# Patient Record
Sex: FEMALE | Race: WHITE | NOT HISPANIC OR LATINO | Employment: UNEMPLOYED | URBAN - METROPOLITAN AREA
[De-identification: names, ages, dates, MRNs, and addresses within clinical notes are randomized per-mention and may not be internally consistent; named-entity substitution may affect disease eponyms.]

---

## 2017-03-01 ENCOUNTER — HOSPITAL ENCOUNTER (INPATIENT)
Facility: HOSPITAL | Age: 32
LOS: 5 days | Discharge: HOME | DRG: 897 | End: 2017-03-06
Attending: PSYCHIATRY & NEUROLOGY | Admitting: PSYCHIATRY & NEUROLOGY
Payer: MEDICAID

## 2017-03-01 DIAGNOSIS — F15.10 AMPHETAMINE ABUSE: ICD-10-CM

## 2017-03-01 DIAGNOSIS — F11.10 OPIOID ABUSE: ICD-10-CM

## 2017-03-01 DIAGNOSIS — F14.10 COCAINE ABUSE: ICD-10-CM

## 2017-03-01 DIAGNOSIS — F19.94 SUBSTANCE INDUCED MOOD DISORDER: Chronic | ICD-10-CM

## 2017-03-01 DIAGNOSIS — F13.10: ICD-10-CM

## 2017-03-01 DIAGNOSIS — F31.9 BIPOLAR DISORDER: ICD-10-CM

## 2017-03-01 PROCEDURE — 25000003 PHARM REV CODE 250: Performed by: PSYCHIATRY & NEUROLOGY

## 2017-03-01 PROCEDURE — 12400001 HC PSYCH SEMI-PRIVATE ROOM

## 2017-03-01 RX ORDER — DIPHENHYDRAMINE HYDROCHLORIDE 50 MG/ML
25 INJECTION INTRAMUSCULAR; INTRAVENOUS EVERY 4 HOURS PRN
Status: DISCONTINUED | OUTPATIENT
Start: 2017-03-01 | End: 2017-03-02

## 2017-03-01 RX ORDER — IBUPROFEN 200 MG
1 TABLET ORAL DAILY
Status: DISCONTINUED | OUTPATIENT
Start: 2017-03-01 | End: 2017-03-06 | Stop reason: HOSPADM

## 2017-03-01 RX ORDER — LORAZEPAM 1 MG/1
1 TABLET ORAL EVERY 4 HOURS PRN
Status: DISCONTINUED | OUTPATIENT
Start: 2017-03-01 | End: 2017-03-02

## 2017-03-01 RX ORDER — HALOPERIDOL 5 MG/ML
2 INJECTION INTRAMUSCULAR EVERY 4 HOURS PRN
Status: DISCONTINUED | OUTPATIENT
Start: 2017-03-01 | End: 2017-03-02

## 2017-03-01 RX ORDER — FOLIC ACID 1 MG/1
1 TABLET ORAL DAILY
Status: DISCONTINUED | OUTPATIENT
Start: 2017-03-02 | End: 2017-03-06 | Stop reason: HOSPADM

## 2017-03-01 RX ORDER — CALCIUM CARBONATE 200(500)MG
1000 TABLET,CHEWABLE ORAL EVERY 8 HOURS PRN
Status: DISCONTINUED | OUTPATIENT
Start: 2017-03-01 | End: 2017-03-06 | Stop reason: HOSPADM

## 2017-03-01 RX ORDER — HALOPERIDOL 2 MG/1
2 TABLET ORAL EVERY 4 HOURS PRN
Status: DISCONTINUED | OUTPATIENT
Start: 2017-03-01 | End: 2017-03-02

## 2017-03-01 RX ORDER — DOCUSATE SODIUM 100 MG/1
100 CAPSULE, LIQUID FILLED ORAL DAILY PRN
Status: DISCONTINUED | OUTPATIENT
Start: 2017-03-01 | End: 2017-03-06 | Stop reason: HOSPADM

## 2017-03-01 RX ORDER — QUETIAPINE FUMARATE 25 MG/1
50 TABLET, FILM COATED ORAL NIGHTLY
Status: DISCONTINUED | OUTPATIENT
Start: 2017-03-01 | End: 2017-03-06 | Stop reason: HOSPADM

## 2017-03-01 RX ORDER — DIPHENHYDRAMINE HCL 25 MG
25 CAPSULE ORAL EVERY 4 HOURS PRN
Status: DISCONTINUED | OUTPATIENT
Start: 2017-03-01 | End: 2017-03-02

## 2017-03-01 RX ORDER — LORAZEPAM 2 MG/ML
1 INJECTION INTRAMUSCULAR EVERY 4 HOURS PRN
Status: DISCONTINUED | OUTPATIENT
Start: 2017-03-01 | End: 2017-03-02

## 2017-03-01 RX ORDER — TRAZODONE HYDROCHLORIDE 50 MG/1
50 TABLET ORAL NIGHTLY PRN
Status: DISCONTINUED | OUTPATIENT
Start: 2017-03-01 | End: 2017-03-06 | Stop reason: HOSPADM

## 2017-03-01 RX ORDER — HYDROXYZINE PAMOATE 25 MG/1
25 CAPSULE ORAL EVERY 8 HOURS PRN
Status: DISCONTINUED | OUTPATIENT
Start: 2017-03-01 | End: 2017-03-06 | Stop reason: HOSPADM

## 2017-03-01 RX ADMIN — QUETIAPINE FUMARATE 50 MG: 25 TABLET, FILM COATED ORAL at 10:03

## 2017-03-01 NOTE — IP AVS SNAPSHOT
[facility]  [address]  [facility] [address]  Phone: [phone]           Patient Discharge Instructions     Our goal is to set you up for success. This packet includes information on your condition, medications, and your home care. It will help you to care for yourself so you don't get sicker and need to go back to the hospital.     Please ask your nurse if you have any questions.        There are many details to remember when preparing to leave the hospital. Here is what you will need to do:    1. Take your medicine. If you are prescribed medications, review your Medication List in the following pages. You may have new medications to  at the pharmacy and others that you'll need to stop taking. Review the instructions for how and when to take your medications. Talk with your doctor or nurses if you are unsure of what to do.     2. Go to your follow-up appointments. Specific follow-up information is listed in the following pages. Your may be contacted by a transition nurse or clinical provider about future appointments. Be sure we have all of the phone numbers to reach you, if needed. Please contact your provider's office if you are unable to make an appointment.     3. Watch for warning signs. Your doctor or nurse will give you detailed warning signs to watch for and when to call for assistance. These instructions may also include educational information about your condition. If you experience any of warning signs to your health, call your doctor.               [facility]  Unless otherwise directed by your provider, please contact [facility], our nurse care line that is available for 24/7 assistance.     [phone] (toll-free)    Registered nurses in the [facility] provide clinical advisement, health education, appointment booking, and other advisory services.                    ** Verify the list of medication(s) below is accurate and up  to date. Carry this with you in case of emergency. If your medications have changed, please notify your healthcare provider.             Medication List      START taking these medications        Additional Info                      lamotrigine 25 MG tablet   Commonly known as:  LAMICTAL   Quantity:  30 tablet   Refills:  0   Dose:  25 mg   Indications:  substance-induced mood disorder    Last time this was given:  25 mg on 3/6/2017  8:11 AM   Instructions:  Take 1 tablet (25 mg total) by mouth once daily.     Begin Date    AM    Noon    PM    Bedtime         CHANGE how you take these medications        Additional Info                      trazodone 50 MG tablet   Commonly known as:  DESYREL   Quantity:  30 tablet   Refills:  0   Dose:  50 mg   Indications:  insomnia   What changed:    - when to take this  - reasons to take this    Instructions:  Take 1 tablet (50 mg total) by mouth nightly as needed for Insomnia.     Begin Date    AM    Noon    PM    Bedtime         CONTINUE taking these medications        Additional Info                      desogestrel-ethinyl estradiol 0.15-0.03 mg per tablet   Commonly known as:  APRI   Quantity:  84 tablet   Refills:  4   Dose:  1 tablet    Instructions:  Take 1 tablet by mouth once daily.     Begin Date    AM    Noon    PM    Bedtime       quetiapine 50 MG tablet   Commonly known as:  SEROQUEL   Quantity:  30 tablet   Refills:  0   Dose:  50 mg   Indications:  substance-induced mood disorder    Last time this was given:  50 mg on 3/5/2017  8:28 PM   Instructions:  Take 1 tablet (50 mg total) by mouth every evening.     Begin Date    AM    Noon    PM    Bedtime         STOP taking these medications     albuterol 90 mcg/actuation inhaler       alprazolam 1 MG tablet   Commonly known as:  XANAX       ibuprofen 800 MG tablet   Commonly known as:  ADVIL,MOTRIN       ondansetron 4 MG Tbdl   Commonly known as:  ZOFRAN-ODT            Where to Get Your Medications      You can get  "these medications from any pharmacy     Bring a paper prescription for each of these medications     lamotrigine 25 MG tablet    quetiapine 50 MG tablet    trazodone 50 MG tablet                  Please bring to all follow up appointments:    1. A copy of your discharge instructions.  2. All medicines you are currently taking in their original bottles.  3. Identification and insurance card.    Please arrive 15 minutes ahead of scheduled appointment time.    Please call 24 hours in advance if you must reschedule your appointment and/or time.        Follow-up Information     Go to [facility].    Specialties:  Behavioral Health, Psychiatry, Psychology    Why:  after care appt. walk in only M-F 8-4:30 p.m.     Contact information:    [address]  [address]  [facility] [address]  [phone]          Discharge Instructions     Future Orders    Activity as tolerated     Call MD for:     Comments:    Suicidal or homicidal ideation or change in behavior    Diet general     Questions:    Total calories:      Fat restriction, if any:      Protein restriction, if any:      Na restriction, if any:      Fluid restriction:      Additional restrictions:          Primary Diagnosis     Your primary diagnosis was:  Substance Induced Mood Disorder      Admission Information     Date & Time Provider Department CSN    3/1/2017  9:20 PM Saqib Everett MD [facility] [id number]       Admisson Diagnosis: Bipolar disorder, unspecified, Bipolar disorder      Care Providers     Provider Role Specialty Primary office phone    Saqib Everett MD Attending Provider Psychiatry [phone]      Your Vitals Were     BP Pulse Temp Resp Height Weight    101/61 (BP Location: Right arm, Patient Position: Sitting, BP Method: Automatic) 101 98.6 °F (37 °C) (Oral) 14 5' 6" (1.676 m) 55.2 kg (121 lb 11.1 oz)    Last Period SpO2 BMI          03/01/2017 100% 19.64 kg/m2        Recent Lab Values     No lab values to display. "      Blood Glucose and Lipid Panel Labs        3/3/2017                           6:05 AM           Cholesterol 152           Triglycerides 70           HDL Cholesterol 54           LDL Cholesterol 84.0           HDL/Cholesterol Ratio 35.5           Total Cholesterol/HDL Ratio 2.8           Non-HDL Cholesterol 98           Comment for Cholesterol at  6:05 AM on 3/3/2017:  The National Cholesterol Education Program (NCEP) has set the  following guidelines (reference ranges) for Cholesterol:  Optimal.....................<200 mg/dL  Borderline High.............200-239 mg/dL  High........................> or = 240 mg/dL      Comment for Triglycerides at  6:05 AM on 3/3/2017:  The National Cholesterol Education Program (NCEP) has set the  following guidelines (reference values) for triglycerides:  Normal......................<150 mg/dL  Borderline High.............150-199 mg/dL  High........................200-499 mg/dL      Comment for HDL Cholesterol at  6:05 AM on 3/3/2017:  The National Cholesterol Education Program (NCEP) has set the  following guidelines (reference values) for HDL Cholesterol:  Low...............<40 mg/dL  Optimal...........>60 mg/dL      Comment for LDL Cholesterol at  6:05 AM on 3/3/2017:  The National Cholesterol Education Program (NCEP) has set the  following guidelines (reference values) for LDL Cholesterol:  Optimal.......................<130 mg/dL  Borderline High...............130-159 mg/dL  High..........................160-189 mg/dL  Very High.....................>190 mg/dL      Comment for Non-HDL Cholesterol at  6:05 AM on 3/3/2017:  Risk category and Non-HDL cholesterol goals:  Coronary heart disease (CHD)or equivalent (10-year risk of CHD >20%):  Non-HDL cholesterol goal     <130 mg/dL  Two or more CHD risk factors and 10-year risk of CHD <= 20%:  Non-HDL cholesterol goal     <160 mg/dL  0 to 1 CHD risk factor:  Non-HDL cholesterol goal     <190 mg/dL        Allergies as of 3/6/2017      No Known Allergies      Advance Directives     An advance directive is a document which, in the event you are no longer able to make decisions for yourself, tells your healthcare team what kind of treatment you do or do not want to receive, or who you would like to make those decisions for you.  If you do not currently have an advance directive, [facility] encourages you to create one.  For more information call:  [phone] ([phone]), [phone] ([phone]),  or log on to [email or web address].        Advance Directive Status          Most Recent Value    Advance Directive Status  Patient does not have Advance Directive, declines information.      Smoking Cessation     If you would like to quit smoking:   You may be eligible for free services if you are a [place] resident and started smoking cigarettes before September 1, 1988.  Call the Smoking Cessation Trust (SCT) toll free at (217) 320-4245 or (898) 335-0645.   Call 5-090-QUIT-NOW if you do not meet the above criteria.            Language Assistance Services     ATTENTION: Language assistance services are available, free of charge. Please call [phone].      ATENCIÓN: Si habla español, tiene a ndiaye disposición servicios gratuitos de asistencia lingüística. Llame al [phone].     CHÚ Ý: N?u b?n nói Ti?ng Vi?t, có các d?ch v? h? tr? ngôn ng? mi?n phí dành cho b?n. G?i s? [phone].        National Suicide Prevention Lifeline     If you or someone you know is thinking about suicide, call the National Suicide Prevention Lifeline.  National Suicide Hotline: 9-319-751-TALK (0849)  The lifeline is free, confidential and always available.  Help a loved one, a friend or yourself.  www.suicideprevenetionlifeline.org          [facility] Sign-Up     Activating your [facility] account is as easy as 1-2-3!     1) Visit [email or web address], select Sign Up Now, enter this activation code and your date of birth, then select  Next.  [email or web address]  Expires: 4/20/2017  1:07 PM      2) Create a username and password to use when you visit [facility] in the future and select a security question in case you lose your password and select Next.    3) Enter your e-mail address and click Sign Up!    Additional Information  If you have questions, please e-mail [email or web address] or call [phone] to talk to our [facility] staff. Remember, [facility] is NOT to be used for urgent needs. For medical emergencies, dial 911.          [facility] complies with applicable Federal civil rights laws and does not discriminate on the basis of race, color, national origin, age, disability, or sex.

## 2017-03-02 PROBLEM — F19.94 SUBSTANCE INDUCED MOOD DISORDER: Chronic | Status: ACTIVE | Noted: 2017-03-01

## 2017-03-02 PROBLEM — F14.10 COCAINE ABUSE: Status: ACTIVE | Noted: 2017-03-02

## 2017-03-02 PROBLEM — F11.10 OPIOID ABUSE: Status: ACTIVE | Noted: 2017-03-02

## 2017-03-02 PROBLEM — F15.10 AMPHETAMINE ABUSE: Status: ACTIVE | Noted: 2017-03-02

## 2017-03-02 PROBLEM — F13.10 SEDATIVE OR HYPNOTIC ABUSE: Status: ACTIVE | Noted: 2017-03-02

## 2017-03-02 PROCEDURE — 25000003 PHARM REV CODE 250: Performed by: PSYCHIATRY & NEUROLOGY

## 2017-03-02 PROCEDURE — 12400001 HC PSYCH SEMI-PRIVATE ROOM

## 2017-03-02 PROCEDURE — 99223 1ST HOSP IP/OBS HIGH 75: CPT | Mod: AF,S$PBB,, | Performed by: PSYCHIATRY & NEUROLOGY

## 2017-03-02 RX ORDER — DIAZEPAM 5 MG/1
5 TABLET ORAL EVERY 6 HOURS PRN
Status: DISCONTINUED | OUTPATIENT
Start: 2017-03-02 | End: 2017-03-06 | Stop reason: HOSPADM

## 2017-03-02 RX ORDER — THIAMINE HCL 100 MG
100 TABLET ORAL DAILY
Status: DISCONTINUED | OUTPATIENT
Start: 2017-03-02 | End: 2017-03-06 | Stop reason: HOSPADM

## 2017-03-02 RX ORDER — DIAZEPAM 5 MG/1
5 TABLET ORAL ONCE
Status: COMPLETED | OUTPATIENT
Start: 2017-03-02 | End: 2017-03-02

## 2017-03-02 RX ORDER — LOPERAMIDE HYDROCHLORIDE 2 MG/1
2 CAPSULE ORAL
Status: DISCONTINUED | OUTPATIENT
Start: 2017-03-02 | End: 2017-03-06 | Stop reason: HOSPADM

## 2017-03-02 RX ORDER — OLANZAPINE 10 MG/1
10 TABLET ORAL EVERY 6 HOURS PRN
Status: DISCONTINUED | OUTPATIENT
Start: 2017-03-02 | End: 2017-03-06 | Stop reason: HOSPADM

## 2017-03-02 RX ORDER — FOLIC ACID 1 MG/1
1 TABLET ORAL DAILY
Status: DISCONTINUED | OUTPATIENT
Start: 2017-03-02 | End: 2017-03-02

## 2017-03-02 RX ORDER — OLANZAPINE 10 MG/2ML
10 INJECTION, POWDER, FOR SOLUTION INTRAMUSCULAR EVERY 6 HOURS PRN
Status: DISCONTINUED | OUTPATIENT
Start: 2017-03-02 | End: 2017-03-06 | Stop reason: HOSPADM

## 2017-03-02 RX ORDER — DICYCLOMINE HYDROCHLORIDE 10 MG/1
10 CAPSULE ORAL EVERY 6 HOURS PRN
Status: DISCONTINUED | OUTPATIENT
Start: 2017-03-02 | End: 2017-03-06 | Stop reason: HOSPADM

## 2017-03-02 RX ORDER — DIAZEPAM 5 MG/1
5 TABLET ORAL 3 TIMES DAILY
Status: DISPENSED | OUTPATIENT
Start: 2017-03-02 | End: 2017-03-04

## 2017-03-02 RX ORDER — CYCLOBENZAPRINE HCL 5 MG
5 TABLET ORAL EVERY 8 HOURS PRN
Status: DISCONTINUED | OUTPATIENT
Start: 2017-03-02 | End: 2017-03-06 | Stop reason: HOSPADM

## 2017-03-02 RX ADMIN — HYDROXYZINE PAMOATE 25 MG: 25 CAPSULE ORAL at 09:03

## 2017-03-02 RX ADMIN — DIAZEPAM 5 MG: 5 TABLET ORAL at 10:03

## 2017-03-02 RX ADMIN — THERA TABS 1 TABLET: TAB at 09:03

## 2017-03-02 RX ADMIN — FOLIC ACID 1 MG: 1 TABLET ORAL at 09:03

## 2017-03-02 RX ADMIN — THIAMINE HCL TAB 100 MG 100 MG: 100 TAB at 10:03

## 2017-03-02 RX ADMIN — QUETIAPINE FUMARATE 50 MG: 25 TABLET, FILM COATED ORAL at 08:03

## 2017-03-02 RX ADMIN — DIAZEPAM 5 MG: 5 TABLET ORAL at 08:03

## 2017-03-02 NOTE — ASSESSMENT & PLAN NOTE
- Utox positive for benzos.  - Reports daily use of Xanax for anxiety prescribed by Dr. John Reaves, PCP.   shows no evidence of concerning drug-seeking or misuse.      - Home regimen:  Xanax XR 1 mg BID and IR 0.5 mg qhs.    - Benzodiazepine withdrawal protocol with [id number]  - Valium taper:  5 mg TID (3/2-3), 5 mg BID (3/4), adjust as necessary  - PRN Valium for withdrawals:  5 mg q6h for s/s of withdrawals (SBP >160, DBP >100, HR >100, diaphoresis, tremulousness)  - thiamine, folic acid, multivitamin  - Counseled on cessation.  - Declines rehab at this time.

## 2017-03-02 NOTE — ASSESSMENT & PLAN NOTE
- Utox positive for cocaine.  - Pt states she was only taste-testing the quality of cocaine for a friend.  Denies using.  - Counseled on cessation.  - Declines rehab at this time.

## 2017-03-02 NOTE — NURSING
Patient lying in bed has been isolative to her room and bed all day.  Behavior appropriate when staff enters the room.  Medication compliant   Patient does come out for meals.

## 2017-03-02 NOTE — ASSESSMENT & PLAN NOTE
- Utox positive for opiates.  - Pt states she took a friend's pain pills for bilateral knee and back pain.  Reports h/o severe opiate dependence with IVDU x5 yrs ago.  Last used four years ago.  Denies current use.    - Opiate withdrawal protocol   - Symptomatic PRN medications:  TUMS (indigestion), Flexeril (muscle spasms), Bentyl (GI spasms), Imodium (diarrhea), Vistaril (anxiety), Colace (constipation)   - Counseled on cessation.  - Declines rehab at this time.
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
## 2017-03-02 NOTE — NURSING
Patient admitted to Acute Psychiatric Unit on 03/01/2017. Arrived to unit at approximately 2115 per nurse and security from [facility]. Per [facility], patient is diagnosed bipolar and is noncompliant with medications. Patient called father and stated she was going to kill herself. Patient has had a decline in ADLs and is sleeping all day. On assessment, patient denies depression or suicidal ideations. Poor insight into situation/hospitalization. Tearful but cooperative during assessment. Oriented to person, place, time. Educated on unit rules, plan of care, policies and procedures. Verbalized understanding.

## 2017-03-02 NOTE — PROGRESS NOTES
03/02/17 1300   [facility] Group Therapy   Group Name (1:1)   Specific Interventions Remotivation   Participation Level Minimal   Participation Quality Sleeping   Affect/Mood Display Blunted   Patient isolating in room most of day; Encouraged patient to make effort to attend groups

## 2017-03-02 NOTE — ASSESSMENT & PLAN NOTE
- Pt reports h/o bipolar disorder.  Mother with bipolar disorder.  Will reassess pt for symptoms of mood disorder after detoxification.  - Continue Seroquel 50 mg qhs for mood stabilization  - Continue Trazodone 50 mg qhs PRN for sleep  - PRN Zyprexa 10 mg PO/IM q6h for agitation

## 2017-03-02 NOTE — ASSESSMENT & PLAN NOTE
- Utox positive for amphetamines.  - Pt states she gets Adderall from friends for poor concentration.  No prior diagnosis of ADHD.  - Counseled on cessation.  - Declines rehab at this time.
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
## 2017-03-03 LAB
CHOLEST/HDLC SERPL: 2.8 {RATIO}
HDL/CHOLESTEROL RATIO: 35.5 %
HDLC SERPL-MCNC: 152 MG/DL
HDLC SERPL-MCNC: 54 MG/DL
LDLC SERPL CALC-MCNC: 84 MG/DL
NONHDLC SERPL-MCNC: 98 MG/DL
TRIGL SERPL-MCNC: 70 MG/DL

## 2017-03-03 PROCEDURE — 25000003 PHARM REV CODE 250: Performed by: PSYCHIATRY & NEUROLOGY

## 2017-03-03 PROCEDURE — 97165 OT EVAL LOW COMPLEX 30 MIN: CPT

## 2017-03-03 PROCEDURE — 80061 LIPID PANEL: CPT

## 2017-03-03 PROCEDURE — 12400001 HC PSYCH SEMI-PRIVATE ROOM

## 2017-03-03 PROCEDURE — 90853 GROUP PSYCHOTHERAPY: CPT | Mod: ,,, | Performed by: PSYCHOLOGIST

## 2017-03-03 PROCEDURE — 36415 COLL VENOUS BLD VENIPUNCTURE: CPT

## 2017-03-03 PROCEDURE — 99233 SBSQ HOSP IP/OBS HIGH 50: CPT | Mod: ,,, | Performed by: PSYCHIATRY & NEUROLOGY

## 2017-03-03 RX ORDER — DIAZEPAM 5 MG/1
5 TABLET ORAL 2 TIMES DAILY
Status: DISCONTINUED | OUTPATIENT
Start: 2017-03-04 | End: 2017-03-06 | Stop reason: HOSPADM

## 2017-03-03 RX ADMIN — QUETIAPINE FUMARATE 50 MG: 25 TABLET, FILM COATED ORAL at 09:03

## 2017-03-03 RX ADMIN — THERA TABS 1 TABLET: TAB at 10:03

## 2017-03-03 RX ADMIN — NICOTINE 1 PATCH: 14 PATCH, EXTENDED RELEASE TRANSDERMAL at 10:03

## 2017-03-03 RX ADMIN — DIAZEPAM 5 MG: 5 TABLET ORAL at 01:03

## 2017-03-03 RX ADMIN — DIAZEPAM 5 MG: 5 TABLET ORAL at 05:03

## 2017-03-03 RX ADMIN — FOLIC ACID 1 MG: 1 TABLET ORAL at 10:03

## 2017-03-03 RX ADMIN — DIAZEPAM 5 MG: 5 TABLET ORAL at 09:03

## 2017-03-03 RX ADMIN — CYCLOBENZAPRINE HYDROCHLORIDE 5 MG: 5 TABLET, FILM COATED ORAL at 07:03

## 2017-03-03 RX ADMIN — THIAMINE HCL TAB 100 MG 100 MG: 100 TAB at 10:03
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
## 2017-03-03 NOTE — PROGRESS NOTES
Group Psychotherapy (PhD/LCSW)    Site: [facility]    Clinical status of patient: Inpatient    Date: 3/3/2017    Group Focus: Life Skills    Length of service: 14761 - 35-40 minutes    Number of patients in attendance: 7    Referred by: Acute Psychiatry Unit Treatment Team    Target symptoms: Substance Abuse and Mood Disorder    Patient's response to treatment: Active Listening and Self-disclosure    Progress toward goals: Progressing slowly    Interval History: Pt participated actively in a group discussion of self-fulfilling prophecies. She tended to focus on her inability to solve her own problems.     Diagnosis: See Dr Gipson's notes dated 3/3/17    Plan: Continue treatment on APU

## 2017-03-03 NOTE — PROGRESS NOTES
03/02/17 2000   [facility] Group Therapy   Group Name Community Reintegration   Specific Interventions Other (see comments)   Participation Level Minimal   Participation Quality Lack of Interest
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
## 2017-03-03 NOTE — NURSING
Patient has been isolative to her room again today.  Mood is improving.  Patient became tearful today thinking about wanting to go home and be with her cat.  No complaints of detox symptoms.  Valium taper decreased.  Patient is dressed.  Appearance appropriate.  Eating and sleeping on the unit.

## 2017-03-03 NOTE — PROGRESS NOTES
tim spoke with Pina with [facility] crisis unit [phone]. Tim also spoke to Marshal earlier today with the same unit regarding pt's admission. Per report, a representative will visit pt in the unit to sign her up for services. Staff is aware of substance abuse problems and pt's stressors with her father. They are also aware pt might be facing issues with housing as her father is not going to allow her to move in with him.
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
## 2017-03-04 PROCEDURE — 25000003 PHARM REV CODE 250: Performed by: PSYCHIATRY & NEUROLOGY

## 2017-03-04 PROCEDURE — 12400001 HC PSYCH SEMI-PRIVATE ROOM

## 2017-03-04 RX ADMIN — QUETIAPINE FUMARATE 50 MG: 25 TABLET, FILM COATED ORAL at 08:03

## 2017-03-04 RX ADMIN — NICOTINE 1 PATCH: 14 PATCH, EXTENDED RELEASE TRANSDERMAL at 09:03

## 2017-03-04 RX ADMIN — DIAZEPAM 5 MG: 5 TABLET ORAL at 08:03

## 2017-03-04 RX ADMIN — FOLIC ACID 1 MG: 1 TABLET ORAL at 09:03

## 2017-03-04 RX ADMIN — HYDROXYZINE PAMOATE 25 MG: 25 CAPSULE ORAL at 03:03

## 2017-03-04 RX ADMIN — CYCLOBENZAPRINE HYDROCHLORIDE 5 MG: 5 TABLET, FILM COATED ORAL at 09:03

## 2017-03-04 RX ADMIN — THERA TABS 1 TABLET: TAB at 09:03

## 2017-03-04 RX ADMIN — DIAZEPAM 5 MG: 5 TABLET ORAL at 09:03

## 2017-03-04 RX ADMIN — THIAMINE HCL TAB 100 MG 100 MG: 100 TAB at 09:03
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
## 2017-03-04 NOTE — PROGRESS NOTES
Pt had ~ 7 hours of sleep. Pt remain free from fall or injury during HS. Continue to monitor pt safety and POC.

## 2017-03-05 PROCEDURE — 25000003 PHARM REV CODE 250: Performed by: PSYCHIATRY & NEUROLOGY

## 2017-03-05 PROCEDURE — 12400001 HC PSYCH SEMI-PRIVATE ROOM

## 2017-03-05 RX ORDER — LAMOTRIGINE 25 MG/1
25 TABLET ORAL DAILY
Status: DISCONTINUED | OUTPATIENT
Start: 2017-03-05 | End: 2017-03-06 | Stop reason: HOSPADM

## 2017-03-05 RX ADMIN — HYDROXYZINE PAMOATE 25 MG: 25 CAPSULE ORAL at 02:03

## 2017-03-05 RX ADMIN — DIAZEPAM 5 MG: 5 TABLET ORAL at 08:03

## 2017-03-05 RX ADMIN — QUETIAPINE FUMARATE 50 MG: 25 TABLET, FILM COATED ORAL at 08:03

## 2017-03-05 RX ADMIN — FOLIC ACID 1 MG: 1 TABLET ORAL at 08:03

## 2017-03-05 RX ADMIN — THERA TABS 1 TABLET: TAB at 08:03

## 2017-03-05 RX ADMIN — LAMOTRIGINE 25 MG: 25 TABLET ORAL at 04:03

## 2017-03-05 RX ADMIN — CYCLOBENZAPRINE HYDROCHLORIDE 5 MG: 5 TABLET, FILM COATED ORAL at 02:03

## 2017-03-05 RX ADMIN — NICOTINE 1 PATCH: 14 PATCH, EXTENDED RELEASE TRANSDERMAL at 08:03

## 2017-03-05 RX ADMIN — THIAMINE HCL TAB 100 MG 100 MG: 100 TAB at 08:03
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
## 2017-03-06 VITALS
SYSTOLIC BLOOD PRESSURE: 109 MMHG | OXYGEN SATURATION: 100 % | WEIGHT: 121.69 LBS | HEART RATE: 109 BPM | DIASTOLIC BLOOD PRESSURE: 66 MMHG | HEIGHT: 66 IN | TEMPERATURE: 99 F | RESPIRATION RATE: 16 BRPM | BODY MASS INDEX: 19.56 KG/M2

## 2017-03-06 PROCEDURE — 90853 GROUP PSYCHOTHERAPY: CPT | Mod: AJ,S$PBB,, | Performed by: PSYCHOLOGIST

## 2017-03-06 PROCEDURE — 99239 HOSP IP/OBS DSCHRG MGMT >30: CPT | Mod: AF,S$PBB,, | Performed by: PSYCHIATRY & NEUROLOGY

## 2017-03-06 PROCEDURE — 25000003 PHARM REV CODE 250: Performed by: PSYCHIATRY & NEUROLOGY

## 2017-03-06 RX ORDER — TRAZODONE HYDROCHLORIDE 50 MG/1
50 TABLET ORAL NIGHTLY PRN
Qty: 30 TABLET | Refills: 0 | Status: SHIPPED | OUTPATIENT
Start: 2017-03-06 | End: 2021-06-03

## 2017-03-06 RX ORDER — LAMOTRIGINE 25 MG/1
25 TABLET ORAL DAILY
Qty: 30 TABLET | Refills: 0 | Status: SHIPPED | OUTPATIENT
Start: 2017-03-06 | End: 2021-05-05 | Stop reason: SDUPTHER

## 2017-03-06 RX ORDER — QUETIAPINE FUMARATE 50 MG/1
50 TABLET, FILM COATED ORAL NIGHTLY
Qty: 30 TABLET | Refills: 0 | Status: SHIPPED | OUTPATIENT
Start: 2017-03-06 | End: 2017-03-06

## 2017-03-06 RX ORDER — QUETIAPINE FUMARATE 50 MG/1
50 TABLET, FILM COATED ORAL NIGHTLY
Qty: 30 TABLET | Refills: 0 | Status: SHIPPED | OUTPATIENT
Start: 2017-03-06 | End: 2021-06-03

## 2017-03-06 RX ADMIN — THIAMINE HCL TAB 100 MG 100 MG: 100 TAB at 08:03

## 2017-03-06 RX ADMIN — NICOTINE 1 PATCH: 14 PATCH, EXTENDED RELEASE TRANSDERMAL at 08:03

## 2017-03-06 RX ADMIN — FOLIC ACID 1 MG: 1 TABLET ORAL at 08:03

## 2017-03-06 RX ADMIN — THERA TABS 1 TABLET: TAB at 08:03

## 2017-03-06 RX ADMIN — LAMOTRIGINE 25 MG: 25 TABLET ORAL at 08:03

## 2017-03-06 RX ADMIN — DIAZEPAM 5 MG: 5 TABLET ORAL at 08:03

## 2017-03-06 RX ADMIN — CYCLOBENZAPRINE HYDROCHLORIDE 5 MG: 5 TABLET, FILM COATED ORAL at 09:03

## 2017-03-06 NOTE — PROGRESS NOTES
03/06/17 0900 03/06/17 1000 03/06/17 1100   [facility]   Group Name Community Reintegration Education (guided imagery)   Specific Interventions Current Events Relapse Prevention Relaxation Training   Participation Level Active;Supportive;Appropriate;Attentive;Sharing Active;Supportive;Appropriate;Attentive;Sharing Active;Appropriate;Attentive;Sharing   Participation Quality Cooperative;Social Cooperative;Social Cooperative;Social   Insight/Motivation Good Good Good   Affect/Mood Display Appropriate Appropriate Appropriate   Cognition Alert;Oriented Alert;Oriented Alert;Oriented        03/06/17 0900 03/06/17 1000 03/06/17 1100   [facility]   Group Name Community Reintegration Education (guided imagery)   Specific Interventions Current Events Relapse Prevention Relaxation Training   Participation Level Active;Supportive;Appropriate;Attentive;Sharing Active;Supportive;Appropriate;Attentive;Sharing Active;Appropriate;Attentive;Sharing   Participation Quality Cooperative;Social Cooperative;Social Cooperative;Social   Insight/Motivation Good Good Good   Affect/Mood Display Appropriate Appropriate Appropriate   Cognition Alert;Oriented Alert;Oriented Alert;Oriented

## 2017-03-06 NOTE — NURSING
Patient discharged to home.  Father provided transportation.  Mood is bright and behavior is appropriate.  Reviewed discharge instructions with patient, she verbalized understanding.  All belongings sent with patient.  Copy of discharge instructions and prescriptions sent with patient.  No injuries noted.

## 2017-03-06 NOTE — PROGRESS NOTES
03/04/17 0900 03/04/17 1000 03/04/17 1100   [facility]   Group Name Community Reintegration Therapeutic Recreation Therapeutic Recreation   Specific Interventions Current Events Cognitive Stimulation Training (tv social)   Participation Level Supportive;Active;Sharing --  --    Participation Quality Cooperative Sleeping Sleeping   Insight/Motivation Limited --  --    Affect/Mood Display Blunted --  --
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
## 2017-03-06 NOTE — PROGRESS NOTES
Group Psychotherapy (PhD/LCSW)    Site: [facility]    Clinical status of patient: Inpatient    Date: 3/6/2017    Group Focus: Life Skills    Length of service: 34154 - 35-40 minutes    Number of patients in attendance: 8    Referred by: Acute Psychiatry Unit Treatment Team    Target symptoms: Substance Abuse and Mood Disorder    Patient's response to treatment: Active Listening and Self-disclosure    Progress toward goals: Progressing slowly    Interval History:   Session focus was Self-Soothe Skills.  Patient appeared alert and attentive in group.  She identified self-soothe skills for each of the five senses.    Diagnosis: See Dr Gipson's notes dated 3/3/17    Plan: Continue treatment on APU until discharge

## 2017-03-06 NOTE — PLAN OF CARE
Problem: Patient Care Overview (Adult)  Goal: Plan of Care Review  Outcome: Ongoing (interventions implemented as appropriate)  Calm and cooperative. Denies SI/HI. Denies A/V hallucinations. Pt reports anxiety and muscle cramps and received medication for these complaints. Bright affect. Compliant medication. Reviewed medication with patient. Pt is aware that if she has a rash she must report it to staff. In and out of the groups. Dressed in street clothes. Will continue to monitor.

## 2017-03-06 NOTE — NURSING
Pt AAOX4,  denies SI/HI/A/V hallucinations. Pt denies pain or discomfort, reports feeling less anxious and depressed than when admitted. Discharge instructions reviewed, pt verbalizes understanding of discharge orders and medication reconciliation.

## 2017-03-06 NOTE — NURSING
Initial encounter with the patient today, Pt AAOX4, denies any complaints or distress. Pt denies SI/HI, verbalizes understanding to notify staff if she feels distressed. Pt compliant and quiet on the unit, is still isolative but beginning to interact more on the unit.

## 2017-03-07 NOTE — PT/OT/SLP DISCHARGE
"Occupational Therapy Discharge Summary    Enriqueta Quiroz  MRN: [id number]   Substance induced mood disorder   Patient Discharged from acute Occupational Therapy on 3/6/2017.  Please refer to prior OT note dated on 3/3/2017 for functional status upon initial evaluation. Pt did not attend OT group.      Assessment:   Patient has not met goals.     GOALS:   1. Pt will attend group with min encouragement and remain for 75% of session.  2. Pt will be able to manage group task 75% with 0 level of frustration.   3. Pt will be able to initiate participation in social task with 0 verbal cues.   4. In order to address social isolation and group activity, Pt will be able to initiate verbalization with group discussion with 0 encouragement. .   5. Pt will verbalize/demonstrate understanding of group purpose with 0 verbal cues at end of session.   6. Pt will report and demo understanding of 1 positive self-affirmation to use to as coping skills.   7. Pt will verbalize/demo understanding and identify use of 1-2 coping skills to use when upset.   8. Pt will verbalize/demo 1 healthy daily routine to incorporate for self modulation and regulation.      Patient Goals:  1."To get out of here"    Reasons for Discontinuation of Therapy Services  Transfer to alternate level of care.      Plan:  Patient Discharged to: Home with Father; no further OT needs at this time     KAYLA Harry 3/7/2017   "

## 2017-03-08 ENCOUNTER — PATIENT OUTREACH (OUTPATIENT)
Dept: ADMINISTRATIVE | Facility: CLINIC | Age: 32
End: 2017-03-08
Payer: MEDICAID

## 2017-03-08 DIAGNOSIS — F19.94 SUBSTANCE INDUCED MOOD DISORDER: Primary | ICD-10-CM

## 2017-03-08 NOTE — PATIENT INSTRUCTIONS
"  Bipolar Disorder  Bipolar disorder is an illness that causes strong mood swings between depression and cristi. It used to be called "manic depression." The mood swings are different from the normal ups and downs we all normally experience in our lives. They are more severe, last longer, and can interfere with work and relationships. These episodes are changes from our usual moods and behavior. Their severity can be mild, or drastic and explosive.  · In a manic episode, you may think fast and do things quickly. It may seem like you are getting a lot done. At first, this may feel very good. But in the extreme this can lead to a lifestyle that is disorganized, chaotic, and includes risky behavior (spending sprees, sexual acting-out, or drug use). In later stages, it may affect eating (no interest in food) and sleeping (unable to sleep for days at a time). Speech may speed up and become difficult for others to understand. You may appear to others as if you are in your own world.  · In a depressive episode, you may feel a lack of interest in normal activities. Sometimes there is sadness or guilt without any clear reason. Thinking may become slow and there can be a lack energy or feeling of hopelessness. Some people have thoughts of harming themselves at this stage. Thoughts can even turn to suicide.  Between these phases you may actually feel OK. This does not mean that the illness is gone. People with this disorder will usually have to treat it all of their life. Medicine and good care can greatly reduce the symptoms.  The exact cause of this illness is unknown. However, there is a genetic link that makes a person more likely to get this problem. Also, the use of drugs such as speed (amphetamine) and cocaine increase the chances of this illness appearing.  Home care  · On-going care and support helps people manage this disease.  Find a healthcare provider and therapist who meet your needs.  Seek help when you feel " like you may be heading into either a manic episode or a depressive state.  · Be sure to take your medicine and get regular blood work to check your the levels of medicine in your body.  Take the medicine and get the follow-up lab work as prescribed, even if you think you dont need to do it.  · Be certain to tell each of your healthcare providers about all of the prescription drugs, over-the-counter medicines, and supplements you take. Certain supplements interact with medicines and result in dangerous side effects.  You can also use your pharmacist as a resource person when you have questions about drug interactions.  · Talk with your family and trusted friends about your thoughts and feelings.  Ask them to help you recognize behavior changes early so you can get help and medicines can be adjusted.  · Alcohol and drugs can bring on an episode, and also make them worse  · If your life is severely impacted by this illness, the Americans with Disabilities Act (ADA) may provide help. The ADA protects people with chronic physical and mental health problems. If you are having trouble keeping jobs, managing workplace issues, or caring for yourself because of your bipolar disorder, contact your local ADA office to see if they can help. The [place] Department  of Justice operates a toll-free ADA information line at: 686.695.9097 (Voice); or 282-149-0579 (TTY).  They can help you locate a local office.    Follow-up care  Follow up with your doctor or therapist as advised. They can help you to find ways to improve your life.  Call 911  Call your healthcare provider right away if any of these occur:  · You have suicidal thoughts, a plan, and the means to  harm yourself  · Trouble breathing  · Confusion  · Drowsiness or trouble wakening  · Fainting or loss of consciousness  · Rapid heart rate, very low heart rate, or a new irregular heart rate  · Seizure  · New chest pain that becomes more severe, lasts longer, or spreads into your  shoulder, arm, neck, jaw, or back  When to seek medical advice  Call your healthcare provider right away if any of these occur:  · Feeling like your symptoms are getting worse (depression, agitation, excess energy)  · Unable to eat or sleep for more than 48 hours  · Feeling out of control (racing thoughts, poor concentration)  · Feeling like you want to harm yourself or another  · Being unable to care for yourself  Date Last Reviewed: 9/29/2015 © 2000-2016 [employer]. [address] [place] [address], [place] [address]. All rights reserved. This information is not intended as a substitute for professional medical care. Always follow your healthcare professional's instructions.

## 2017-03-09 ENCOUNTER — TELEPHONE (OUTPATIENT)
Dept: PHARMACY | Facility: CLINIC | Age: 32
End: 2017-03-09

## 2017-03-09 NOTE — TELEPHONE ENCOUNTER
This patient has Medicaid so she doesn't qualify for any programs with Pharmacy Patient Assistance.  I called to inform her about medicaid and he stated she was aware that she has medicaid.  I provided my contact information for future assistance.

## 2021-04-19 ENCOUNTER — LAB VISIT (OUTPATIENT)
Dept: PRIMARY CARE CLINIC | Facility: OTHER | Age: 36
End: 2021-04-19
Payer: MEDICAID

## 2021-04-19 DIAGNOSIS — Z20.822 ENCOUNTER FOR LABORATORY TESTING FOR COVID-19 VIRUS: ICD-10-CM

## 2021-04-19 PROCEDURE — U0003 INFECTIOUS AGENT DETECTION BY NUCLEIC ACID (DNA OR RNA); SEVERE ACUTE RESPIRATORY SYNDROME CORONAVIRUS 2 (SARS-COV-2) (CORONAVIRUS DISEASE [COVID-19]), AMPLIFIED PROBE TECHNIQUE, MAKING USE OF HIGH THROUGHPUT TECHNOLOGIES AS DESCRIBED BY CMS-2020-01-R: HCPCS | Performed by: INTERNAL MEDICINE

## 2021-04-20 LAB — SARS-COV-2 RNA RESP QL NAA+PROBE: NOT DETECTED

## 2021-05-05 ENCOUNTER — INITIAL PRENATAL (OUTPATIENT)
Dept: OBSTETRICS AND GYNECOLOGY | Facility: CLINIC | Age: 36
End: 2021-05-05
Payer: MEDICAID

## 2021-05-05 ENCOUNTER — PATIENT MESSAGE (OUTPATIENT)
Dept: OBSTETRICS AND GYNECOLOGY | Facility: CLINIC | Age: 36
End: 2021-05-05

## 2021-05-05 VITALS — SYSTOLIC BLOOD PRESSURE: 141 MMHG | DIASTOLIC BLOOD PRESSURE: 86 MMHG

## 2021-05-05 DIAGNOSIS — N91.2 AMENORRHEA: Primary | ICD-10-CM

## 2021-05-05 LAB
BACTERIA #/AREA URNS AUTO: ABNORMAL /HPF
BILIRUB UR QL STRIP: NEGATIVE
CAOX CRY UR QL COMP ASSIST: ABNORMAL
CLARITY UR REFRACT.AUTO: ABNORMAL
COLOR UR AUTO: YELLOW
GLUCOSE UR QL STRIP: NEGATIVE
HGB UR QL STRIP: NEGATIVE
KETONES UR QL STRIP: NEGATIVE
LEUKOCYTE ESTERASE UR QL STRIP: ABNORMAL
MICROSCOPIC COMMENT: ABNORMAL
NITRITE UR QL STRIP: NEGATIVE
PH UR STRIP: 7 [PH] (ref 5–8)
PROT UR QL STRIP: NEGATIVE
RBC #/AREA URNS AUTO: 1 /HPF (ref 0–4)
SP GR UR STRIP: 1.02 (ref 1–1.03)
SQUAMOUS #/AREA URNS AUTO: 9 /HPF
URN SPEC COLLECT METH UR: ABNORMAL
WBC #/AREA URNS AUTO: 11 /HPF (ref 0–5)

## 2021-05-05 PROCEDURE — 87077 CULTURE AEROBIC IDENTIFY: CPT | Performed by: OBSTETRICS & GYNECOLOGY

## 2021-05-05 PROCEDURE — 99203 PR OFFICE/OUTPT VISIT, NEW, LEVL III, 30-44 MIN: ICD-10-PCS | Mod: TH,S$PBB,, | Performed by: OBSTETRICS & GYNECOLOGY

## 2021-05-05 PROCEDURE — 99999 PR PBB SHADOW E&M-EST. PATIENT-LVL II: ICD-10-PCS | Mod: PBBFAC,,, | Performed by: OBSTETRICS & GYNECOLOGY

## 2021-05-05 PROCEDURE — 99212 OFFICE O/P EST SF 10 MIN: CPT | Mod: PBBFAC | Performed by: OBSTETRICS & GYNECOLOGY

## 2021-05-05 PROCEDURE — 99999 PR PBB SHADOW E&M-EST. PATIENT-LVL II: CPT | Mod: PBBFAC,,, | Performed by: OBSTETRICS & GYNECOLOGY

## 2021-05-05 PROCEDURE — 87086 URINE CULTURE/COLONY COUNT: CPT | Performed by: OBSTETRICS & GYNECOLOGY

## 2021-05-05 PROCEDURE — 81001 URINALYSIS AUTO W/SCOPE: CPT | Performed by: OBSTETRICS & GYNECOLOGY

## 2021-05-05 PROCEDURE — 87088 URINE BACTERIA CULTURE: CPT | Performed by: OBSTETRICS & GYNECOLOGY

## 2021-05-05 PROCEDURE — 99203 OFFICE O/P NEW LOW 30 MIN: CPT | Mod: TH,S$PBB,, | Performed by: OBSTETRICS & GYNECOLOGY

## 2021-05-05 PROCEDURE — 87186 SC STD MICRODIL/AGAR DIL: CPT | Performed by: OBSTETRICS & GYNECOLOGY

## 2021-05-05 RX ORDER — PROMETHAZINE HYDROCHLORIDE 12.5 MG/1
12.5 TABLET ORAL EVERY 8 HOURS PRN
Qty: 30 TABLET | Refills: 1 | Status: SHIPPED | OUTPATIENT
Start: 2021-05-05 | End: 2021-06-04 | Stop reason: SDUPTHER

## 2021-05-05 RX ORDER — LAMOTRIGINE 25 MG/1
25 TABLET ORAL DAILY
Qty: 30 TABLET | Refills: 0 | Status: SHIPPED | OUTPATIENT
Start: 2021-05-05 | End: 2021-06-03 | Stop reason: SDUPTHER

## 2021-05-05 RX ORDER — LORAZEPAM 0.5 MG/1
0.5 TABLET ORAL DAILY PRN
Qty: 8 TABLET | Refills: 0 | Status: SHIPPED | OUTPATIENT
Start: 2021-05-05 | End: 2021-06-03

## 2021-05-05 RX ORDER — LORAZEPAM 0.5 MG/1
0.5 TABLET ORAL DAILY
COMMUNITY
Start: 2021-03-23 | End: 2021-05-05 | Stop reason: SDUPTHER

## 2021-05-07 ENCOUNTER — PATIENT MESSAGE (OUTPATIENT)
Dept: OBSTETRICS AND GYNECOLOGY | Facility: CLINIC | Age: 36
End: 2021-05-07

## 2021-05-07 LAB — BACTERIA UR CULT: ABNORMAL

## 2021-05-09 ENCOUNTER — PATIENT MESSAGE (OUTPATIENT)
Dept: OBSTETRICS AND GYNECOLOGY | Facility: CLINIC | Age: 36
End: 2021-05-09

## 2021-05-18 ENCOUNTER — PATIENT MESSAGE (OUTPATIENT)
Dept: OBSTETRICS AND GYNECOLOGY | Facility: CLINIC | Age: 36
End: 2021-05-18

## 2021-05-20 ENCOUNTER — PATIENT MESSAGE (OUTPATIENT)
Dept: ADMINISTRATIVE | Facility: OTHER | Age: 36
End: 2021-05-20

## 2021-05-21 ENCOUNTER — PATIENT MESSAGE (OUTPATIENT)
Dept: OBSTETRICS AND GYNECOLOGY | Facility: CLINIC | Age: 36
End: 2021-05-21

## 2021-05-24 ENCOUNTER — TELEPHONE (OUTPATIENT)
Dept: OBSTETRICS AND GYNECOLOGY | Facility: CLINIC | Age: 36
End: 2021-05-24

## 2021-05-24 DIAGNOSIS — O99.341 ANXIETY IN PREGNANCY IN FIRST TRIMESTER, ANTEPARTUM: Primary | ICD-10-CM

## 2021-05-24 DIAGNOSIS — F41.9 ANXIETY IN PREGNANCY IN FIRST TRIMESTER, ANTEPARTUM: Primary | ICD-10-CM

## 2021-05-25 ENCOUNTER — TELEPHONE (OUTPATIENT)
Dept: OBSTETRICS AND GYNECOLOGY | Facility: CLINIC | Age: 36
End: 2021-05-25

## 2021-05-25 ENCOUNTER — PATIENT MESSAGE (OUTPATIENT)
Dept: OBSTETRICS AND GYNECOLOGY | Facility: CLINIC | Age: 36
End: 2021-05-25

## 2021-05-25 DIAGNOSIS — N91.2 AMENORRHEA: Primary | ICD-10-CM

## 2021-05-25 DIAGNOSIS — F31.9 BIPOLAR AFFECTIVE DISORDER, REMISSION STATUS UNSPECIFIED: Primary | ICD-10-CM

## 2021-05-25 RX ORDER — LORAZEPAM 0.5 MG/1
0.5 TABLET ORAL 2 TIMES DAILY PRN
Qty: 30 TABLET | Refills: 0 | Status: SHIPPED | OUTPATIENT
Start: 2021-05-25 | End: 2021-06-03 | Stop reason: SDUPTHER

## 2021-05-27 ENCOUNTER — PATIENT MESSAGE (OUTPATIENT)
Dept: OBSTETRICS AND GYNECOLOGY | Facility: CLINIC | Age: 36
End: 2021-05-27

## 2021-05-27 ENCOUNTER — PATIENT MESSAGE (OUTPATIENT)
Dept: ADMINISTRATIVE | Facility: OTHER | Age: 36
End: 2021-05-27

## 2021-05-28 ENCOUNTER — TELEPHONE (OUTPATIENT)
Dept: OBSTETRICS AND GYNECOLOGY | Facility: CLINIC | Age: 36
End: 2021-05-28

## 2021-05-28 ENCOUNTER — SOCIAL WORK (OUTPATIENT)
Dept: OBSTETRICS AND GYNECOLOGY | Facility: OTHER | Age: 36
End: 2021-05-28

## 2021-05-28 ENCOUNTER — PATIENT MESSAGE (OUTPATIENT)
Dept: PSYCHIATRY | Facility: CLINIC | Age: 36
End: 2021-05-28

## 2021-05-28 ENCOUNTER — PATIENT MESSAGE (OUTPATIENT)
Dept: OBSTETRICS AND GYNECOLOGY | Facility: CLINIC | Age: 36
End: 2021-05-28

## 2021-06-03 ENCOUNTER — ROUTINE PRENATAL (OUTPATIENT)
Dept: OBSTETRICS AND GYNECOLOGY | Facility: CLINIC | Age: 36
End: 2021-06-03
Payer: MEDICAID

## 2021-06-03 VITALS
WEIGHT: 130.06 LBS | DIASTOLIC BLOOD PRESSURE: 65 MMHG | BODY MASS INDEX: 20.99 KG/M2 | SYSTOLIC BLOOD PRESSURE: 120 MMHG

## 2021-06-03 DIAGNOSIS — Z3A.14 14 WEEKS GESTATION OF PREGNANCY: Primary | ICD-10-CM

## 2021-06-03 PROCEDURE — 99213 OFFICE O/P EST LOW 20 MIN: CPT | Mod: TH,S$PBB,, | Performed by: OBSTETRICS & GYNECOLOGY

## 2021-06-03 PROCEDURE — 99999 PR PBB SHADOW E&M-EST. PATIENT-LVL II: ICD-10-PCS | Mod: PBBFAC,,, | Performed by: OBSTETRICS & GYNECOLOGY

## 2021-06-03 PROCEDURE — 99212 OFFICE O/P EST SF 10 MIN: CPT | Mod: PBBFAC | Performed by: OBSTETRICS & GYNECOLOGY

## 2021-06-03 PROCEDURE — 99213 PR OFFICE/OUTPT VISIT, EST, LEVL III, 20-29 MIN: ICD-10-PCS | Mod: TH,S$PBB,, | Performed by: OBSTETRICS & GYNECOLOGY

## 2021-06-03 PROCEDURE — 99999 PR PBB SHADOW E&M-EST. PATIENT-LVL II: CPT | Mod: PBBFAC,,, | Performed by: OBSTETRICS & GYNECOLOGY

## 2021-06-03 RX ORDER — LAMOTRIGINE 100 MG/1
100 TABLET ORAL DAILY
COMMUNITY
Start: 2021-05-07 | End: 2021-06-03

## 2021-06-03 RX ORDER — LORAZEPAM 0.5 MG/1
0.5 TABLET ORAL 2 TIMES DAILY PRN
Qty: 60 TABLET | Refills: 0 | Status: SHIPPED | OUTPATIENT
Start: 2021-06-03 | End: 2021-07-08 | Stop reason: SDUPTHER

## 2021-06-03 RX ORDER — LAMOTRIGINE 25 MG/1
50 TABLET ORAL DAILY
Qty: 60 TABLET | Refills: 1 | Status: SHIPPED | OUTPATIENT
Start: 2021-06-03 | End: 2021-07-08 | Stop reason: SDUPTHER

## 2021-06-04 ENCOUNTER — PATIENT MESSAGE (OUTPATIENT)
Dept: MATERNAL FETAL MEDICINE | Facility: CLINIC | Age: 36
End: 2021-06-04

## 2021-06-04 ENCOUNTER — PATIENT MESSAGE (OUTPATIENT)
Dept: OBSTETRICS AND GYNECOLOGY | Facility: CLINIC | Age: 36
End: 2021-06-04

## 2021-06-04 ENCOUNTER — TELEPHONE (OUTPATIENT)
Dept: MATERNAL FETAL MEDICINE | Facility: CLINIC | Age: 36
End: 2021-06-04

## 2021-06-04 ENCOUNTER — PATIENT MESSAGE (OUTPATIENT)
Dept: PSYCHIATRY | Facility: CLINIC | Age: 36
End: 2021-06-04

## 2021-06-04 ENCOUNTER — TELEPHONE (OUTPATIENT)
Dept: PSYCHIATRY | Facility: CLINIC | Age: 36
End: 2021-06-04

## 2021-06-07 ENCOUNTER — TELEPHONE (OUTPATIENT)
Dept: ADMINISTRATIVE | Facility: OTHER | Age: 36
End: 2021-06-07

## 2021-06-10 ENCOUNTER — PATIENT MESSAGE (OUTPATIENT)
Dept: OBSTETRICS AND GYNECOLOGY | Facility: CLINIC | Age: 36
End: 2021-06-10

## 2021-06-11 ENCOUNTER — OFFICE VISIT (OUTPATIENT)
Dept: PSYCHIATRY | Facility: CLINIC | Age: 36
End: 2021-06-11
Payer: MEDICAID

## 2021-06-11 DIAGNOSIS — F43.9 TRAUMA AND STRESSOR-RELATED DISORDER: ICD-10-CM

## 2021-06-11 DIAGNOSIS — F31.60 BIPOLAR 1 DISORDER, MIXED: Primary | ICD-10-CM

## 2021-06-11 PROCEDURE — 90791 PR PSYCHIATRIC DIAGNOSTIC EVALUATION: ICD-10-PCS | Mod: HP,HB,95, | Performed by: PSYCHOLOGIST

## 2021-06-11 PROCEDURE — 90791 PSYCH DIAGNOSTIC EVALUATION: CPT | Mod: HP,HB,95, | Performed by: PSYCHOLOGIST

## 2021-06-14 ENCOUNTER — PROCEDURE VISIT (OUTPATIENT)
Dept: MATERNAL FETAL MEDICINE | Facility: CLINIC | Age: 36
End: 2021-06-14
Payer: MEDICAID

## 2021-06-14 ENCOUNTER — OFFICE VISIT (OUTPATIENT)
Dept: MATERNAL FETAL MEDICINE | Facility: CLINIC | Age: 36
End: 2021-06-14
Payer: MEDICAID

## 2021-06-14 DIAGNOSIS — Z32.01 POSITIVE PREGNANCY TEST: ICD-10-CM

## 2021-06-14 DIAGNOSIS — Z36.89 ENCOUNTER FOR FETAL ANATOMIC SURVEY: Primary | ICD-10-CM

## 2021-06-14 DIAGNOSIS — Z3A.14 14 WEEKS GESTATION OF PREGNANCY: ICD-10-CM

## 2021-06-14 DIAGNOSIS — N94.89 ADNEXAL MASS: ICD-10-CM

## 2021-06-14 PROCEDURE — 76815 OB US LIMITED FETUS(S): CPT | Mod: PBBFAC | Performed by: OBSTETRICS & GYNECOLOGY

## 2021-06-14 PROCEDURE — 76817 TRANSVAGINAL US OBSTETRIC: CPT | Mod: PBBFAC | Performed by: OBSTETRICS & GYNECOLOGY

## 2021-06-14 PROCEDURE — 76815 PR  US,PREGNANT UTERUS,LIMITED, 1/> FETUSES: ICD-10-PCS | Mod: 26,S$PBB,, | Performed by: OBSTETRICS & GYNECOLOGY

## 2021-06-14 PROCEDURE — 76815 OB US LIMITED FETUS(S): CPT | Mod: 26,S$PBB,, | Performed by: OBSTETRICS & GYNECOLOGY

## 2021-06-14 PROCEDURE — 99205 OFFICE O/P NEW HI 60 MIN: CPT | Mod: 25,S$PBB,TH, | Performed by: OBSTETRICS & GYNECOLOGY

## 2021-06-14 PROCEDURE — 76817 PR US, OB, TRANSVAG APPROACH: ICD-10-PCS | Mod: 26,S$PBB,, | Performed by: OBSTETRICS & GYNECOLOGY

## 2021-06-14 PROCEDURE — 76817 TRANSVAGINAL US OBSTETRIC: CPT | Mod: 26,S$PBB,, | Performed by: OBSTETRICS & GYNECOLOGY

## 2021-06-14 PROCEDURE — 99205 PR OFFICE/OUTPT VISIT, NEW, LEVL V, 60-74 MIN: ICD-10-PCS | Mod: 25,S$PBB,TH, | Performed by: OBSTETRICS & GYNECOLOGY

## 2021-06-22 ENCOUNTER — PROCEDURE VISIT (OUTPATIENT)
Dept: MATERNAL FETAL MEDICINE | Facility: CLINIC | Age: 36
End: 2021-06-22
Payer: MEDICAID

## 2021-06-22 ENCOUNTER — OFFICE VISIT (OUTPATIENT)
Dept: MATERNAL FETAL MEDICINE | Facility: CLINIC | Age: 36
End: 2021-06-22
Payer: MEDICAID

## 2021-06-22 VITALS
SYSTOLIC BLOOD PRESSURE: 111 MMHG | HEIGHT: 66 IN | WEIGHT: 139.13 LBS | DIASTOLIC BLOOD PRESSURE: 77 MMHG | BODY MASS INDEX: 22.36 KG/M2

## 2021-06-22 DIAGNOSIS — O26.899 PELVIC MASS DURING PREGNANCY: ICD-10-CM

## 2021-06-22 DIAGNOSIS — Z36.89 ENCOUNTER FOR FETAL ANATOMIC SURVEY: ICD-10-CM

## 2021-06-22 DIAGNOSIS — R19.00 PELVIC MASS DURING PREGNANCY: ICD-10-CM

## 2021-06-22 DIAGNOSIS — Z36.89 ENCOUNTER FOR FETAL ANATOMIC SURVEY: Primary | ICD-10-CM

## 2021-06-22 DIAGNOSIS — Z36.89 ENCOUNTER FOR ULTRASOUND TO ASSESS FETAL GROWTH: ICD-10-CM

## 2021-06-22 PROCEDURE — 76817 TRANSVAGINAL US OBSTETRIC: CPT | Mod: 26,S$PBB,, | Performed by: OBSTETRICS & GYNECOLOGY

## 2021-06-22 PROCEDURE — 76815 PR  US,PREGNANT UTERUS,LIMITED, 1/> FETUSES: ICD-10-PCS | Mod: 26,S$PBB,, | Performed by: OBSTETRICS & GYNECOLOGY

## 2021-06-22 PROCEDURE — 99999 PR PBB SHADOW E&M-EST. PATIENT-LVL III: CPT | Mod: PBBFAC,,, | Performed by: OBSTETRICS & GYNECOLOGY

## 2021-06-22 PROCEDURE — 99215 OFFICE O/P EST HI 40 MIN: CPT | Mod: 25,S$PBB,TH, | Performed by: OBSTETRICS & GYNECOLOGY

## 2021-06-22 PROCEDURE — 99213 OFFICE O/P EST LOW 20 MIN: CPT | Mod: PBBFAC,TH | Performed by: OBSTETRICS & GYNECOLOGY

## 2021-06-22 PROCEDURE — 76815 OB US LIMITED FETUS(S): CPT | Mod: 26,S$PBB,, | Performed by: OBSTETRICS & GYNECOLOGY

## 2021-06-22 PROCEDURE — 99215 PR OFFICE/OUTPT VISIT, EST, LEVL V, 40-54 MIN: ICD-10-PCS | Mod: 25,S$PBB,TH, | Performed by: OBSTETRICS & GYNECOLOGY

## 2021-06-22 PROCEDURE — 76815 OB US LIMITED FETUS(S): CPT | Mod: PBBFAC | Performed by: OBSTETRICS & GYNECOLOGY

## 2021-06-22 PROCEDURE — 76817 PR US, OB, TRANSVAG APPROACH: ICD-10-PCS | Mod: 26,S$PBB,, | Performed by: OBSTETRICS & GYNECOLOGY

## 2021-06-22 PROCEDURE — 76817 TRANSVAGINAL US OBSTETRIC: CPT | Mod: PBBFAC | Performed by: OBSTETRICS & GYNECOLOGY

## 2021-06-22 PROCEDURE — 99999 PR PBB SHADOW E&M-EST. PATIENT-LVL III: ICD-10-PCS | Mod: PBBFAC,,, | Performed by: OBSTETRICS & GYNECOLOGY

## 2021-06-24 ENCOUNTER — TELEPHONE (OUTPATIENT)
Dept: OBSTETRICS AND GYNECOLOGY | Facility: CLINIC | Age: 36
End: 2021-06-24

## 2021-06-24 ENCOUNTER — PATIENT MESSAGE (OUTPATIENT)
Dept: OBSTETRICS AND GYNECOLOGY | Facility: CLINIC | Age: 36
End: 2021-06-24

## 2021-06-24 DIAGNOSIS — R19.07 GENERALIZED INTRA-ABDOMINAL AND PELVIC SWELLING, MASS AND LUMP: ICD-10-CM

## 2021-06-25 ENCOUNTER — TELEPHONE (OUTPATIENT)
Dept: OBSTETRICS AND GYNECOLOGY | Facility: CLINIC | Age: 36
End: 2021-06-25

## 2021-06-25 ENCOUNTER — PATIENT MESSAGE (OUTPATIENT)
Dept: OBSTETRICS AND GYNECOLOGY | Facility: CLINIC | Age: 36
End: 2021-06-25

## 2021-07-05 ENCOUNTER — PATIENT MESSAGE (OUTPATIENT)
Dept: OBSTETRICS AND GYNECOLOGY | Facility: CLINIC | Age: 36
End: 2021-07-05

## 2021-07-08 ENCOUNTER — ROUTINE PRENATAL (OUTPATIENT)
Dept: OBSTETRICS AND GYNECOLOGY | Facility: CLINIC | Age: 36
End: 2021-07-08
Payer: MEDICAID

## 2021-07-08 ENCOUNTER — APPOINTMENT (OUTPATIENT)
Dept: LAB | Facility: OTHER | Age: 36
End: 2021-07-08
Payer: MEDICAID

## 2021-07-08 ENCOUNTER — TELEPHONE (OUTPATIENT)
Dept: OBSTETRICS AND GYNECOLOGY | Facility: CLINIC | Age: 36
End: 2021-07-08

## 2021-07-08 VITALS — WEIGHT: 142 LBS | BODY MASS INDEX: 22.92 KG/M2 | DIASTOLIC BLOOD PRESSURE: 57 MMHG | SYSTOLIC BLOOD PRESSURE: 130 MMHG

## 2021-07-08 DIAGNOSIS — Z3A.19 19 WEEKS GESTATION OF PREGNANCY: Primary | ICD-10-CM

## 2021-07-08 PROCEDURE — 99999 PR PBB SHADOW E&M-EST. PATIENT-LVL II: CPT | Mod: PBBFAC,,, | Performed by: OBSTETRICS & GYNECOLOGY

## 2021-07-08 PROCEDURE — 99213 PR OFFICE/OUTPT VISIT, EST, LEVL III, 20-29 MIN: ICD-10-PCS | Mod: TH,S$PBB,, | Performed by: OBSTETRICS & GYNECOLOGY

## 2021-07-08 PROCEDURE — 80307 DRUG TEST PRSMV CHEM ANLYZR: CPT | Performed by: OBSTETRICS & GYNECOLOGY

## 2021-07-08 PROCEDURE — 99999 PR PBB SHADOW E&M-EST. PATIENT-LVL II: ICD-10-PCS | Mod: PBBFAC,,, | Performed by: OBSTETRICS & GYNECOLOGY

## 2021-07-08 PROCEDURE — 87491 CHLMYD TRACH DNA AMP PROBE: CPT | Performed by: OBSTETRICS & GYNECOLOGY

## 2021-07-08 PROCEDURE — 87591 N.GONORRHOEAE DNA AMP PROB: CPT | Performed by: OBSTETRICS & GYNECOLOGY

## 2021-07-08 PROCEDURE — 99213 OFFICE O/P EST LOW 20 MIN: CPT | Mod: TH,S$PBB,, | Performed by: OBSTETRICS & GYNECOLOGY

## 2021-07-08 PROCEDURE — 99212 OFFICE O/P EST SF 10 MIN: CPT | Mod: PBBFAC | Performed by: OBSTETRICS & GYNECOLOGY

## 2021-07-08 RX ORDER — LAMOTRIGINE 25 MG/1
50 TABLET ORAL DAILY
Qty: 60 TABLET | Refills: 1 | Status: SHIPPED | OUTPATIENT
Start: 2021-07-08 | End: 2021-09-04 | Stop reason: SDUPTHER

## 2021-07-08 RX ORDER — LORAZEPAM 0.5 MG/1
0.5 TABLET ORAL 2 TIMES DAILY PRN
Qty: 60 TABLET | Refills: 0 | Status: SHIPPED | OUTPATIENT
Start: 2021-07-08 | End: 2021-08-04 | Stop reason: SDUPTHER

## 2021-07-09 ENCOUNTER — HOSPITAL ENCOUNTER (OUTPATIENT)
Dept: RADIOLOGY | Facility: OTHER | Age: 36
Discharge: HOME | End: 2021-07-09
Attending: OBSTETRICS & GYNECOLOGY
Payer: MEDICAID

## 2021-07-09 ENCOUNTER — PATIENT MESSAGE (OUTPATIENT)
Dept: OBSTETRICS AND GYNECOLOGY | Facility: CLINIC | Age: 36
End: 2021-07-09

## 2021-07-09 ENCOUNTER — TELEPHONE (OUTPATIENT)
Dept: PSYCHIATRY | Facility: CLINIC | Age: 36
End: 2021-07-09

## 2021-07-09 ENCOUNTER — DOCUMENTATION ONLY (OUTPATIENT)
Dept: MATERNAL FETAL MEDICINE | Facility: CLINIC | Age: 36
End: 2021-07-09

## 2021-07-09 DIAGNOSIS — R19.07 GENERALIZED INTRA-ABDOMINAL AND PELVIC SWELLING, MASS AND LUMP: ICD-10-CM

## 2021-07-09 PROCEDURE — 72195 MRI PELVIS W/O DYE: CPT | Mod: TC

## 2021-07-09 PROCEDURE — 72195 MRI PELVIS W/O DYE: CPT | Mod: 26,,, | Performed by: RADIOLOGY

## 2021-07-09 PROCEDURE — 72195 MRI FEMALE PELVIS WITHOUT CONTRAST: ICD-10-PCS | Mod: 26,,, | Performed by: RADIOLOGY

## 2021-07-11 ENCOUNTER — PATIENT MESSAGE (OUTPATIENT)
Dept: OBSTETRICS AND GYNECOLOGY | Facility: CLINIC | Age: 36
End: 2021-07-11

## 2021-07-11 DIAGNOSIS — Z3A.19 19 WEEKS GESTATION OF PREGNANCY: Primary | ICD-10-CM

## 2021-07-12 ENCOUNTER — PATIENT MESSAGE (OUTPATIENT)
Dept: OBSTETRICS AND GYNECOLOGY | Facility: CLINIC | Age: 36
End: 2021-07-12

## 2021-07-12 LAB
C TRACH DNA SPEC QL NAA+PROBE: NOT DETECTED
N GONORRHOEA DNA SPEC QL NAA+PROBE: NOT DETECTED

## 2021-07-13 LAB
6MAM UR QL: NOT DETECTED
7AMINOCLONAZEPAM UR QL: NOT DETECTED
A-OH ALPRAZ UR QL: NOT DETECTED
ALPHA-OH-MIDAZOLAM: NOT DETECTED
ALPRAZ UR QL: NOT DETECTED
AMPHET UR QL SCN: PRESENT
ANNOTATION COMMENT IMP: NORMAL
ANNOTATION COMMENT IMP: NORMAL
BARBITURATES UR QL: NOT DETECTED
BUPRENORPHINE UR QL: NOT DETECTED
BZE UR QL: NOT DETECTED
CARBOXYTHC UR QL: PRESENT
CARISOPRODOL UR QL: NOT DETECTED
CLONAZEPAM UR QL: NOT DETECTED
CODEINE UR QL: NOT DETECTED
CREAT UR-MCNC: 90.2 MG/DL (ref 20–400)
DIAZEPAM UR QL: NOT DETECTED
ETHYL GLUCURONIDE UR QL: NOT DETECTED
FENTANYL UR QL: NOT DETECTED
GABAPENTIN: NOT DETECTED
HYDROCODONE UR QL: NOT DETECTED
HYDROMORPHONE UR QL: NOT DETECTED
LORAZEPAM UR QL: NOT DETECTED
MDA UR QL: NOT DETECTED
MDEA UR QL: NOT DETECTED
MDMA UR QL: NOT DETECTED
ME-PHENIDATE UR QL: NOT DETECTED
METHADONE UR QL: NOT DETECTED
METHAMPHET UR QL: PRESENT
MIDAZOLAM UR QL SCN: NOT DETECTED
MORPHINE UR QL: NOT DETECTED
NALOXONE: NOT DETECTED
NORBUPRENORPHINE UR QL CFM: NOT DETECTED
NORDIAZEPAM UR QL: NOT DETECTED
NORFENTANYL UR QL: NOT DETECTED
NORHYDROCODONE UR QL CFM: NOT DETECTED
NORMEPERIDINE UR QL CFM: NOT DETECTED
NOROXYCODONE UR QL CFM: NOT DETECTED
NOROXYMORPHONE UR QL SCN: NOT DETECTED
OXAZEPAM UR QL: NOT DETECTED
OXYCODONE UR QL: NOT DETECTED
OXYMORPHONE UR QL: NOT DETECTED
PATHOLOGY STUDY: NORMAL
PCP UR QL: NOT DETECTED
PHENTERMINE UR QL: NOT DETECTED
PREGABALIN: NOT DETECTED
SERVICE CMNT-IMP: NORMAL
TAPENTADOL UR QL SCN: NOT DETECTED
TAPENTADOL-O-SULF: NOT DETECTED
TEMAZEPAM UR QL: NOT DETECTED
TRAMADOL UR QL: NOT DETECTED
ZOLPIDEM METABOLITE: NOT DETECTED
ZOLPIDEM UR QL: NOT DETECTED

## 2021-07-15 ENCOUNTER — PATIENT MESSAGE (OUTPATIENT)
Dept: OBSTETRICS AND GYNECOLOGY | Facility: CLINIC | Age: 36
End: 2021-07-15

## 2021-07-16 ENCOUNTER — TELEPHONE (OUTPATIENT)
Dept: MATERNAL FETAL MEDICINE | Facility: CLINIC | Age: 36
End: 2021-07-16

## 2021-07-16 ENCOUNTER — TELEPHONE (OUTPATIENT)
Dept: PSYCHIATRY | Facility: CLINIC | Age: 36
End: 2021-07-16

## 2021-07-16 ENCOUNTER — PATIENT MESSAGE (OUTPATIENT)
Dept: PSYCHIATRY | Facility: CLINIC | Age: 36
End: 2021-07-16

## 2021-07-29 ENCOUNTER — PATIENT MESSAGE (OUTPATIENT)
Dept: OBSTETRICS AND GYNECOLOGY | Facility: CLINIC | Age: 36
End: 2021-07-29

## 2021-07-29 ENCOUNTER — PATIENT MESSAGE (OUTPATIENT)
Dept: MATERNAL FETAL MEDICINE | Facility: CLINIC | Age: 36
End: 2021-07-29

## 2021-08-01 ENCOUNTER — PATIENT MESSAGE (OUTPATIENT)
Dept: OBSTETRICS AND GYNECOLOGY | Facility: CLINIC | Age: 36
End: 2021-08-01

## 2021-08-05 ENCOUNTER — PATIENT MESSAGE (OUTPATIENT)
Dept: OBSTETRICS AND GYNECOLOGY | Facility: CLINIC | Age: 36
End: 2021-08-05

## 2021-08-10 ENCOUNTER — OFFICE VISIT (OUTPATIENT)
Dept: PSYCHIATRY | Facility: CLINIC | Age: 36
End: 2021-08-10
Payer: MEDICAID

## 2021-08-10 DIAGNOSIS — F41.1 GAD (GENERALIZED ANXIETY DISORDER): ICD-10-CM

## 2021-08-10 DIAGNOSIS — F90.2 ADHD (ATTENTION DEFICIT HYPERACTIVITY DISORDER), COMBINED TYPE: ICD-10-CM

## 2021-08-10 DIAGNOSIS — F31.9 BIPOLAR AFFECTIVE DISORDER, REMISSION STATUS UNSPECIFIED: Primary | ICD-10-CM

## 2021-08-10 PROCEDURE — 90792 PSYCH DIAG EVAL W/MED SRVCS: CPT | Mod: 95,SA,HB, | Performed by: PHYSICIAN ASSISTANT

## 2021-08-10 PROCEDURE — 90792 PR PSYCHIATRIC DIAGNOSTIC EVALUATION W/MEDICAL SERVICES: ICD-10-PCS | Mod: 95,SA,HB, | Performed by: PHYSICIAN ASSISTANT

## 2021-08-11 ENCOUNTER — PATIENT MESSAGE (OUTPATIENT)
Dept: MATERNAL FETAL MEDICINE | Facility: CLINIC | Age: 36
End: 2021-08-11

## 2021-08-13 ENCOUNTER — PATIENT MESSAGE (OUTPATIENT)
Dept: MATERNAL FETAL MEDICINE | Facility: CLINIC | Age: 36
End: 2021-08-13

## 2021-08-13 ENCOUNTER — OFFICE VISIT (OUTPATIENT)
Dept: MATERNAL FETAL MEDICINE | Facility: CLINIC | Age: 36
End: 2021-08-13
Payer: MEDICAID

## 2021-08-13 ENCOUNTER — PROCEDURE VISIT (OUTPATIENT)
Dept: MATERNAL FETAL MEDICINE | Facility: CLINIC | Age: 36
End: 2021-08-13
Payer: MEDICAID

## 2021-08-13 VITALS
DIASTOLIC BLOOD PRESSURE: 90 MMHG | HEIGHT: 66 IN | WEIGHT: 151.44 LBS | SYSTOLIC BLOOD PRESSURE: 132 MMHG | BODY MASS INDEX: 24.34 KG/M2

## 2021-08-13 DIAGNOSIS — O10.919 CHRONIC HYPERTENSION AFFECTING PREGNANCY: ICD-10-CM

## 2021-08-13 DIAGNOSIS — Z72.0 TOBACCO ABUSE: ICD-10-CM

## 2021-08-13 DIAGNOSIS — G43.109 COMPLICATED MIGRAINE: ICD-10-CM

## 2021-08-13 DIAGNOSIS — F19.11 HISTORY OF SUBSTANCE ABUSE: ICD-10-CM

## 2021-08-13 DIAGNOSIS — Z36.89 ENCOUNTER FOR ULTRASOUND TO ASSESS FETAL GROWTH: ICD-10-CM

## 2021-08-13 DIAGNOSIS — F19.94 SUBSTANCE INDUCED MOOD DISORDER: Chronic | ICD-10-CM

## 2021-08-13 DIAGNOSIS — O09.512 ADVANCED MATERNAL AGE, PRIMIGRAVIDA IN SECOND TRIMESTER, ANTEPARTUM: ICD-10-CM

## 2021-08-13 DIAGNOSIS — Z3A.19 19 WEEKS GESTATION OF PREGNANCY: ICD-10-CM

## 2021-08-13 DIAGNOSIS — O26.899 PELVIC MASS DURING PREGNANCY: ICD-10-CM

## 2021-08-13 DIAGNOSIS — Z36.89 ENCOUNTER FOR FETAL ANATOMIC SURVEY: ICD-10-CM

## 2021-08-13 DIAGNOSIS — R19.00 PELVIC MASS DURING PREGNANCY: ICD-10-CM

## 2021-08-13 DIAGNOSIS — Z79.899 LONG-TERM CURRENT USE OF BENZODIAZEPINE: ICD-10-CM

## 2021-08-13 DIAGNOSIS — Z36.89 ENCOUNTER FOR ULTRASOUND TO CHECK FETAL GROWTH: Primary | ICD-10-CM

## 2021-08-13 PROCEDURE — 76811 OB US DETAILED SNGL FETUS: CPT | Mod: 26,S$PBB,, | Performed by: OBSTETRICS & GYNECOLOGY

## 2021-08-13 PROCEDURE — 99999 PR PBB SHADOW E&M-EST. PATIENT-LVL III: ICD-10-PCS | Mod: PBBFAC,,, | Performed by: OBSTETRICS & GYNECOLOGY

## 2021-08-13 PROCEDURE — 76811 PR US, OB FETAL EVAL & EXAM, TRANSABDOM,FIRST GESTATION: ICD-10-PCS | Mod: 26,S$PBB,, | Performed by: OBSTETRICS & GYNECOLOGY

## 2021-08-13 PROCEDURE — 99215 OFFICE O/P EST HI 40 MIN: CPT | Mod: S$PBB,TH,25, | Performed by: OBSTETRICS & GYNECOLOGY

## 2021-08-13 PROCEDURE — 99999 PR PBB SHADOW E&M-EST. PATIENT-LVL III: CPT | Mod: PBBFAC,,, | Performed by: OBSTETRICS & GYNECOLOGY

## 2021-08-13 PROCEDURE — 99215 PR OFFICE/OUTPT VISIT, EST, LEVL V, 40-54 MIN: ICD-10-PCS | Mod: S$PBB,TH,25, | Performed by: OBSTETRICS & GYNECOLOGY

## 2021-08-13 PROCEDURE — 99213 OFFICE O/P EST LOW 20 MIN: CPT | Mod: PBBFAC,TH,25 | Performed by: OBSTETRICS & GYNECOLOGY

## 2021-08-13 PROCEDURE — 76811 OB US DETAILED SNGL FETUS: CPT | Mod: PBBFAC | Performed by: OBSTETRICS & GYNECOLOGY

## 2021-08-18 ENCOUNTER — SOCIAL WORK (OUTPATIENT)
Dept: OBSTETRICS AND GYNECOLOGY | Facility: OTHER | Age: 36
End: 2021-08-18

## 2021-08-18 DIAGNOSIS — Z3A.24 24 WEEKS GESTATION OF PREGNANCY: Primary | ICD-10-CM

## 2021-08-19 ENCOUNTER — PATIENT MESSAGE (OUTPATIENT)
Dept: ADMINISTRATIVE | Facility: OTHER | Age: 36
End: 2021-08-19

## 2021-08-27 ENCOUNTER — TELEPHONE (OUTPATIENT)
Dept: OBSTETRICS AND GYNECOLOGY | Facility: CLINIC | Age: 36
End: 2021-08-27

## 2021-08-28 RX ORDER — LORAZEPAM 0.5 MG/1
0.5 TABLET ORAL 2 TIMES DAILY PRN
Qty: 60 TABLET | Refills: 0 | Status: CANCELLED | OUTPATIENT
Start: 2021-08-28 | End: 2022-08-28

## 2021-08-28 RX ORDER — LAMOTRIGINE 25 MG/1
50 TABLET ORAL DAILY
Qty: 60 TABLET | Refills: 1 | Status: CANCELLED | OUTPATIENT
Start: 2021-08-28 | End: 2022-08-28

## 2021-08-31 ENCOUNTER — PATIENT MESSAGE (OUTPATIENT)
Dept: OBSTETRICS AND GYNECOLOGY | Facility: CLINIC | Age: 36
End: 2021-08-31

## 2021-09-09 ENCOUNTER — PATIENT MESSAGE (OUTPATIENT)
Dept: PSYCHIATRY | Facility: CLINIC | Age: 36
End: 2021-09-09

## 2021-09-09 ENCOUNTER — PATIENT MESSAGE (OUTPATIENT)
Dept: ADMINISTRATIVE | Facility: OTHER | Age: 36
End: 2021-09-09

## 2021-09-09 ENCOUNTER — PATIENT MESSAGE (OUTPATIENT)
Dept: MATERNAL FETAL MEDICINE | Facility: CLINIC | Age: 36
End: 2021-09-09

## 2021-09-10 DIAGNOSIS — F31.9 BIPOLAR AFFECTIVE DISORDER, REMISSION STATUS UNSPECIFIED: Primary | ICD-10-CM

## 2021-09-10 RX ORDER — CLINDAMYCIN HYDROCHLORIDE 150 MG/1
450 CAPSULE ORAL 3 TIMES DAILY
Qty: 45 CAPSULE | Refills: 0 | Status: SHIPPED | OUTPATIENT
Start: 2021-09-10 | End: 2021-09-15

## 2021-09-10 RX ORDER — CLINDAMYCIN HYDROCHLORIDE 150 MG/1
450 CAPSULE ORAL 3 TIMES DAILY
Qty: 45 CAPSULE | Refills: 0 | Status: SHIPPED | OUTPATIENT
Start: 2021-09-10 | End: 2021-09-10 | Stop reason: SDUPTHER

## 2021-09-10 RX ORDER — LAMOTRIGINE 100 MG/1
100 TABLET ORAL DAILY
Qty: 60 TABLET | Refills: 1 | Status: SHIPPED | OUTPATIENT
Start: 2021-09-10 | End: 2021-09-28 | Stop reason: SDUPTHER

## 2021-09-13 ENCOUNTER — LAB VISIT (OUTPATIENT)
Dept: LAB | Facility: OTHER | Age: 36
End: 2021-09-13
Attending: OBSTETRICS & GYNECOLOGY
Payer: MEDICAID

## 2021-09-13 ENCOUNTER — PATIENT MESSAGE (OUTPATIENT)
Dept: OBSTETRICS AND GYNECOLOGY | Facility: CLINIC | Age: 36
End: 2021-09-13

## 2021-09-13 ENCOUNTER — ROUTINE PRENATAL (OUTPATIENT)
Dept: OBSTETRICS AND GYNECOLOGY | Facility: CLINIC | Age: 36
End: 2021-09-13
Payer: MEDICAID

## 2021-09-13 ENCOUNTER — PROCEDURE VISIT (OUTPATIENT)
Dept: MATERNAL FETAL MEDICINE | Facility: CLINIC | Age: 36
End: 2021-09-13
Payer: MEDICAID

## 2021-09-13 VITALS
DIASTOLIC BLOOD PRESSURE: 85 MMHG | WEIGHT: 153.44 LBS | BODY MASS INDEX: 24.77 KG/M2 | SYSTOLIC BLOOD PRESSURE: 127 MMHG

## 2021-09-13 DIAGNOSIS — O34.10 LEIOMYOMA IN PREGNANCY, UTERINE, ANTEPARTUM: ICD-10-CM

## 2021-09-13 DIAGNOSIS — D25.9 LEIOMYOMA IN PREGNANCY, UTERINE, ANTEPARTUM: ICD-10-CM

## 2021-09-13 DIAGNOSIS — Z3A.28 28 WEEKS GESTATION OF PREGNANCY: Primary | ICD-10-CM

## 2021-09-13 DIAGNOSIS — Z3A.24 24 WEEKS GESTATION OF PREGNANCY: ICD-10-CM

## 2021-09-13 DIAGNOSIS — R03.0 ELEVATED BLOOD PRESSURE READING: ICD-10-CM

## 2021-09-13 DIAGNOSIS — Z36.89 ENCOUNTER FOR ULTRASOUND TO CHECK FETAL GROWTH: ICD-10-CM

## 2021-09-13 DIAGNOSIS — O09.523 MULTIGRAVIDA OF ADVANCED MATERNAL AGE IN THIRD TRIMESTER: ICD-10-CM

## 2021-09-13 LAB
ABO + RH BLD: NORMAL
ALBUMIN SERPL BCP-MCNC: 3 G/DL (ref 3.5–5.2)
ALP SERPL-CCNC: 77 U/L (ref 55–135)
ALT SERPL W/O P-5'-P-CCNC: 16 U/L (ref 10–44)
ANION GAP SERPL CALC-SCNC: 10 MMOL/L (ref 8–16)
AST SERPL-CCNC: 15 U/L (ref 10–40)
BILIRUB SERPL-MCNC: 0.2 MG/DL (ref 0.1–1)
BLD GP AB SCN CELLS X3 SERPL QL: NORMAL
BUN SERPL-MCNC: 12 MG/DL (ref 6–20)
CALCIUM SERPL-MCNC: 8.7 MG/DL (ref 8.7–10.5)
CHLORIDE SERPL-SCNC: 103 MMOL/L (ref 95–110)
CO2 SERPL-SCNC: 20 MMOL/L (ref 23–29)
CREAT SERPL-MCNC: 0.6 MG/DL (ref 0.5–1.4)
ERYTHROCYTE [DISTWIDTH] IN BLOOD BY AUTOMATED COUNT: 13.5 % (ref 11.5–14.5)
EST. GFR  (AFRICAN AMERICAN): >60 ML/MIN/1.73 M^2
EST. GFR  (NON AFRICAN AMERICAN): >60 ML/MIN/1.73 M^2
GLUCOSE SERPL-MCNC: 85 MG/DL (ref 70–140)
GLUCOSE SERPL-MCNC: 86 MG/DL (ref 70–110)
HCT VFR BLD AUTO: 33.7 % (ref 37–48.5)
HGB BLD-MCNC: 11.2 G/DL (ref 12–16)
MCH RBC QN AUTO: 26.4 PG (ref 27–31)
MCHC RBC AUTO-ENTMCNC: 33.2 G/DL (ref 32–36)
MCV RBC AUTO: 79 FL (ref 82–98)
PLATELET # BLD AUTO: 279 K/UL (ref 150–450)
PMV BLD AUTO: 9.8 FL (ref 9.2–12.9)
POTASSIUM SERPL-SCNC: 4 MMOL/L (ref 3.5–5.1)
PROT SERPL-MCNC: 7.2 G/DL (ref 6–8.4)
RBC # BLD AUTO: 4.25 M/UL (ref 4–5.4)
SODIUM SERPL-SCNC: 133 MMOL/L (ref 136–145)
WBC # BLD AUTO: 12.58 K/UL (ref 3.9–12.7)

## 2021-09-13 PROCEDURE — 99999 PR PBB SHADOW E&M-EST. PATIENT-LVL II: ICD-10-PCS | Mod: PBBFAC,,, | Performed by: OBSTETRICS & GYNECOLOGY

## 2021-09-13 PROCEDURE — 76816 OB US FOLLOW-UP PER FETUS: CPT | Mod: PBBFAC | Performed by: OBSTETRICS & GYNECOLOGY

## 2021-09-13 PROCEDURE — 99212 OFFICE O/P EST SF 10 MIN: CPT | Mod: PBBFAC | Performed by: OBSTETRICS & GYNECOLOGY

## 2021-09-13 PROCEDURE — 99213 PR OFFICE/OUTPT VISIT, EST, LEVL III, 20-29 MIN: ICD-10-PCS | Mod: TH,S$PBB,, | Performed by: OBSTETRICS & GYNECOLOGY

## 2021-09-13 PROCEDURE — 36415 COLL VENOUS BLD VENIPUNCTURE: CPT | Performed by: OBSTETRICS & GYNECOLOGY

## 2021-09-13 PROCEDURE — 86762 RUBELLA ANTIBODY: CPT | Performed by: OBSTETRICS & GYNECOLOGY

## 2021-09-13 PROCEDURE — 76816 PR  US,PREGNANT UTERUS,F/U,TRANSABD APP: ICD-10-PCS | Mod: 26,S$PBB,, | Performed by: OBSTETRICS & GYNECOLOGY

## 2021-09-13 PROCEDURE — 85027 COMPLETE CBC AUTOMATED: CPT | Performed by: OBSTETRICS & GYNECOLOGY

## 2021-09-13 PROCEDURE — 76816 OB US FOLLOW-UP PER FETUS: CPT | Mod: 26,S$PBB,, | Performed by: OBSTETRICS & GYNECOLOGY

## 2021-09-13 PROCEDURE — 82950 GLUCOSE TEST: CPT | Performed by: OBSTETRICS & GYNECOLOGY

## 2021-09-13 PROCEDURE — 86592 SYPHILIS TEST NON-TREP QUAL: CPT | Performed by: OBSTETRICS & GYNECOLOGY

## 2021-09-13 PROCEDURE — 99213 OFFICE O/P EST LOW 20 MIN: CPT | Mod: TH,S$PBB,, | Performed by: OBSTETRICS & GYNECOLOGY

## 2021-09-13 PROCEDURE — 86787 VARICELLA-ZOSTER ANTIBODY: CPT | Performed by: OBSTETRICS & GYNECOLOGY

## 2021-09-13 PROCEDURE — 80053 COMPREHEN METABOLIC PANEL: CPT | Performed by: OBSTETRICS & GYNECOLOGY

## 2021-09-13 PROCEDURE — 99999 PR PBB SHADOW E&M-EST. PATIENT-LVL II: CPT | Mod: PBBFAC,,, | Performed by: OBSTETRICS & GYNECOLOGY

## 2021-09-13 PROCEDURE — 87389 HIV-1 AG W/HIV-1&-2 AB AG IA: CPT | Performed by: OBSTETRICS & GYNECOLOGY

## 2021-09-13 PROCEDURE — 86900 BLOOD TYPING SEROLOGIC ABO: CPT | Performed by: OBSTETRICS & GYNECOLOGY

## 2021-09-13 PROCEDURE — 87340 HEPATITIS B SURFACE AG IA: CPT | Performed by: OBSTETRICS & GYNECOLOGY

## 2021-09-14 ENCOUNTER — PATIENT MESSAGE (OUTPATIENT)
Dept: OBSTETRICS AND GYNECOLOGY | Facility: CLINIC | Age: 36
End: 2021-09-14

## 2021-09-14 PROBLEM — O09.899 RUBELLA NON-IMMUNE STATUS, ANTEPARTUM: Status: ACTIVE | Noted: 2021-09-14

## 2021-09-14 PROBLEM — Z28.39 RUBELLA NON-IMMUNE STATUS, ANTEPARTUM: Status: ACTIVE | Noted: 2021-09-14

## 2021-09-14 LAB
HBV SURFACE AG SERPL QL IA: NEGATIVE
HIV 1+2 AB+HIV1 P24 AG SERPL QL IA: NEGATIVE
RPR SER QL: NORMAL
RUBV IGG SER-ACNC: 9.1 IU/ML
RUBV IGG SER-IMP: ABNORMAL

## 2021-09-15 ENCOUNTER — PATIENT MESSAGE (OUTPATIENT)
Dept: PSYCHIATRY | Facility: CLINIC | Age: 36
End: 2021-09-15

## 2021-09-15 LAB
VARICELLA INTERPRETATION: POSITIVE
VARICELLA ZOSTER IGG: 3.16 ISR (ref 0–0.9)

## 2021-09-28 ENCOUNTER — PATIENT MESSAGE (OUTPATIENT)
Dept: OBSTETRICS AND GYNECOLOGY | Facility: CLINIC | Age: 36
End: 2021-09-28

## 2021-09-30 RX ORDER — LAMOTRIGINE 100 MG/1
100 TABLET ORAL DAILY
Qty: 60 TABLET | Refills: 1 | Status: SHIPPED | OUTPATIENT
Start: 2021-09-30 | End: 2021-10-06 | Stop reason: SDUPTHER

## 2021-10-06 ENCOUNTER — TELEPHONE (OUTPATIENT)
Dept: OBSTETRICS AND GYNECOLOGY | Facility: CLINIC | Age: 36
End: 2021-10-06

## 2021-10-06 DIAGNOSIS — O10.919 CHRONIC HYPERTENSION AFFECTING PREGNANCY: Primary | ICD-10-CM

## 2021-10-06 RX ORDER — LORAZEPAM 0.5 MG/1
0.5 TABLET ORAL 2 TIMES DAILY PRN
Qty: 60 TABLET | Refills: 0 | Status: SHIPPED | OUTPATIENT
Start: 2021-10-06 | End: 2021-10-19 | Stop reason: SDUPTHER

## 2021-10-06 RX ORDER — LAMOTRIGINE 100 MG/1
100 TABLET ORAL DAILY
Qty: 60 TABLET | Refills: 1 | Status: SHIPPED | OUTPATIENT
Start: 2021-10-06 | End: 2021-10-19 | Stop reason: SDUPTHER

## 2021-10-07 ENCOUNTER — HOSPITAL ENCOUNTER (EMERGENCY)
Facility: OTHER | Age: 36
Discharge: HOME | End: 2021-10-07
Attending: OBSTETRICS & GYNECOLOGY
Payer: MEDICAID

## 2021-10-07 ENCOUNTER — HOSPITAL ENCOUNTER (OUTPATIENT)
Dept: PERINATAL CARE | Facility: OTHER | Age: 36
Discharge: HOME | End: 2021-10-07
Attending: OBSTETRICS & GYNECOLOGY
Payer: MEDICAID

## 2021-10-07 ENCOUNTER — PATIENT MESSAGE (OUTPATIENT)
Dept: ADMINISTRATIVE | Facility: OTHER | Age: 36
End: 2021-10-07

## 2021-10-07 VITALS
SYSTOLIC BLOOD PRESSURE: 116 MMHG | HEART RATE: 100 BPM | TEMPERATURE: 97 F | DIASTOLIC BLOOD PRESSURE: 68 MMHG | OXYGEN SATURATION: 100 % | RESPIRATION RATE: 18 BRPM

## 2021-10-07 DIAGNOSIS — Z36.89 NST (NON-STRESS TEST) REACTIVE: ICD-10-CM

## 2021-10-07 DIAGNOSIS — Z3A.32 32 WEEKS GESTATION OF PREGNANCY: Primary | ICD-10-CM

## 2021-10-07 DIAGNOSIS — Z3A.28 28 WEEKS GESTATION OF PREGNANCY: ICD-10-CM

## 2021-10-07 DIAGNOSIS — O10.919 CHRONIC HYPERTENSION AFFECTING PREGNANCY: Primary | ICD-10-CM

## 2021-10-07 PROCEDURE — 99283 PR EMERGENCY DEPT VISIT,LEVEL III: ICD-10-PCS | Mod: 25,,, | Performed by: OBSTETRICS & GYNECOLOGY

## 2021-10-07 PROCEDURE — 99284 EMERGENCY DEPT VISIT MOD MDM: CPT | Mod: 25

## 2021-10-07 PROCEDURE — 59025 PR FETAL 2N-STRESS TEST: ICD-10-PCS | Mod: 26,,, | Performed by: OBSTETRICS & GYNECOLOGY

## 2021-10-07 PROCEDURE — 59025 PR FETAL 2N-STRESS TEST: ICD-10-PCS | Mod: 26,77,, | Performed by: OBSTETRICS & GYNECOLOGY

## 2021-10-07 PROCEDURE — 99283 EMERGENCY DEPT VISIT LOW MDM: CPT | Mod: 25,,, | Performed by: OBSTETRICS & GYNECOLOGY

## 2021-10-07 PROCEDURE — 59025 FETAL NON-STRESS TEST: CPT

## 2021-10-07 PROCEDURE — 59025 FETAL NON-STRESS TEST: CPT | Mod: 26,77,, | Performed by: OBSTETRICS & GYNECOLOGY

## 2021-10-07 PROCEDURE — 59025 FETAL NON-STRESS TEST: CPT | Mod: 26,,, | Performed by: OBSTETRICS & GYNECOLOGY

## 2021-10-08 ENCOUNTER — PATIENT MESSAGE (OUTPATIENT)
Dept: OBSTETRICS AND GYNECOLOGY | Facility: CLINIC | Age: 36
End: 2021-10-08

## 2021-10-08 ENCOUNTER — TELEPHONE (OUTPATIENT)
Dept: OBSTETRICS AND GYNECOLOGY | Facility: CLINIC | Age: 36
End: 2021-10-08

## 2021-10-08 DIAGNOSIS — Z34.93 THIRD TRIMESTER PREGNANCY: Primary | ICD-10-CM

## 2021-10-14 ENCOUNTER — CLINICAL SUPPORT (OUTPATIENT)
Dept: OBSTETRICS AND GYNECOLOGY | Facility: CLINIC | Age: 36
End: 2021-10-14
Payer: MEDICAID

## 2021-10-14 ENCOUNTER — HOSPITAL ENCOUNTER (OUTPATIENT)
Dept: PERINATAL CARE | Facility: OTHER | Age: 36
Discharge: HOME | End: 2021-10-14
Attending: OBSTETRICS & GYNECOLOGY
Payer: MEDICAID

## 2021-10-14 ENCOUNTER — ROUTINE PRENATAL (OUTPATIENT)
Dept: OBSTETRICS AND GYNECOLOGY | Facility: CLINIC | Age: 36
End: 2021-10-14
Payer: MEDICAID

## 2021-10-14 VITALS
BODY MASS INDEX: 25.44 KG/M2 | WEIGHT: 157.63 LBS | SYSTOLIC BLOOD PRESSURE: 122 MMHG | DIASTOLIC BLOOD PRESSURE: 82 MMHG

## 2021-10-14 DIAGNOSIS — Z23 NEED FOR DIPHTHERIA-TETANUS-PERTUSSIS (TDAP) VACCINE: Primary | ICD-10-CM

## 2021-10-14 DIAGNOSIS — Z3A.28 28 WEEKS GESTATION OF PREGNANCY: ICD-10-CM

## 2021-10-14 DIAGNOSIS — Z3A.33 33 WEEKS GESTATION OF PREGNANCY: Primary | ICD-10-CM

## 2021-10-14 PROCEDURE — 99211 OFF/OP EST MAY X REQ PHY/QHP: CPT | Mod: PBBFAC,25,27

## 2021-10-14 PROCEDURE — 99999 PR PBB SHADOW E&M-EST. PATIENT-LVL I: CPT | Mod: PBBFAC,,,

## 2021-10-14 PROCEDURE — 99999 PR PBB SHADOW E&M-EST. PATIENT-LVL II: ICD-10-PCS | Mod: PBBFAC,,, | Performed by: PHYSICIAN ASSISTANT

## 2021-10-14 PROCEDURE — 99212 OFFICE O/P EST SF 10 MIN: CPT | Mod: PBBFAC,25 | Performed by: PHYSICIAN ASSISTANT

## 2021-10-14 PROCEDURE — 90471 IMMUNIZATION ADMIN: CPT | Mod: PBBFAC

## 2021-10-14 PROCEDURE — 99999 PR PBB SHADOW E&M-EST. PATIENT-LVL II: CPT | Mod: PBBFAC,,, | Performed by: PHYSICIAN ASSISTANT

## 2021-10-14 PROCEDURE — 99212 OFFICE O/P EST SF 10 MIN: CPT | Mod: TH,S$PBB,25, | Performed by: PHYSICIAN ASSISTANT

## 2021-10-14 PROCEDURE — 99212 PR OFFICE/OUTPT VISIT, EST, LEVL II, 10-19 MIN: ICD-10-PCS | Mod: TH,S$PBB,25, | Performed by: PHYSICIAN ASSISTANT

## 2021-10-14 PROCEDURE — 76815 OB US LIMITED FETUS(S): CPT

## 2021-10-14 PROCEDURE — 99999 PR PBB SHADOW E&M-EST. PATIENT-LVL I: ICD-10-PCS | Mod: PBBFAC,,,

## 2021-10-14 PROCEDURE — 59025 FETAL NON-STRESS TEST: CPT

## 2021-10-18 ENCOUNTER — PATIENT MESSAGE (OUTPATIENT)
Dept: MATERNAL FETAL MEDICINE | Facility: CLINIC | Age: 36
End: 2021-10-18
Payer: MEDICAID

## 2021-10-19 ENCOUNTER — OFFICE VISIT (OUTPATIENT)
Dept: PSYCHIATRY | Facility: CLINIC | Age: 36
End: 2021-10-19
Payer: MEDICAID

## 2021-10-19 DIAGNOSIS — F13.20 BENZODIAZEPINE DEPENDENCE: Primary | ICD-10-CM

## 2021-10-19 DIAGNOSIS — F31.9 BIPOLAR 1 DISORDER: ICD-10-CM

## 2021-10-19 DIAGNOSIS — F41.1 GAD (GENERALIZED ANXIETY DISORDER): ICD-10-CM

## 2021-10-19 PROCEDURE — 99214 PR OFFICE/OUTPT VISIT, EST, LEVL IV, 30-39 MIN: ICD-10-PCS | Mod: SA,HB,95, | Performed by: PHYSICIAN ASSISTANT

## 2021-10-19 PROCEDURE — 99214 OFFICE O/P EST MOD 30 MIN: CPT | Mod: SA,HB,95, | Performed by: PHYSICIAN ASSISTANT

## 2021-10-19 RX ORDER — LORAZEPAM 0.5 MG/1
0.5 TABLET ORAL 2 TIMES DAILY PRN
Qty: 60 TABLET | Refills: 0 | Status: SHIPPED | OUTPATIENT
Start: 2021-10-19 | End: 2021-12-06 | Stop reason: SDUPTHER

## 2021-10-19 RX ORDER — LAMOTRIGINE 100 MG/1
100 TABLET ORAL DAILY
Qty: 60 TABLET | Refills: 1 | Status: SHIPPED | OUTPATIENT
Start: 2021-10-19 | End: 2021-12-07 | Stop reason: SDUPTHER

## 2021-10-21 ENCOUNTER — HOSPITAL ENCOUNTER (OUTPATIENT)
Dept: PERINATAL CARE | Facility: OTHER | Age: 36
Discharge: HOME | DRG: 832 | End: 2021-10-21
Attending: OBSTETRICS & GYNECOLOGY
Payer: MEDICAID

## 2021-10-21 ENCOUNTER — HOSPITAL ENCOUNTER (INPATIENT)
Facility: OTHER | Age: 36
LOS: 1 days | Discharge: LEFT BEFORE DISCHARGE | DRG: 832 | End: 2021-10-22
Attending: OBSTETRICS & GYNECOLOGY | Admitting: OBSTETRICS & GYNECOLOGY
Payer: MEDICAID

## 2021-10-21 DIAGNOSIS — Z3A.28 28 WEEKS GESTATION OF PREGNANCY: ICD-10-CM

## 2021-10-21 DIAGNOSIS — O36.8390 FETAL HEART RATE DECELERATIONS AFFECTING MANAGEMENT OF MOTHER: ICD-10-CM

## 2021-10-21 DIAGNOSIS — Z3A.34 34 WEEKS GESTATION OF PREGNANCY: Primary | ICD-10-CM

## 2021-10-21 PROBLEM — O99.343 BIPOLAR DISEASE DURING PREGNANCY IN THIRD TRIMESTER: Status: ACTIVE | Noted: 2021-10-21

## 2021-10-21 PROBLEM — F19.10 POLYSUBSTANCE ABUSE: Status: ACTIVE | Noted: 2021-10-21

## 2021-10-21 PROBLEM — F31.9 BIPOLAR DISEASE DURING PREGNANCY IN THIRD TRIMESTER: Status: ACTIVE | Noted: 2021-10-21

## 2021-10-21 PROBLEM — O09.513 PRIMIGRAVIDA OF ADVANCED MATERNAL AGE IN THIRD TRIMESTER: Status: ACTIVE | Noted: 2021-10-21

## 2021-10-21 LAB
ABO + RH BLD: NORMAL
ALBUMIN SERPL BCP-MCNC: 2.7 G/DL (ref 3.5–5.2)
ALP SERPL-CCNC: 132 U/L (ref 55–135)
ALT SERPL W/O P-5'-P-CCNC: 17 U/L (ref 10–44)
AMPHET+METHAMPHET UR QL: ABNORMAL
ANION GAP SERPL CALC-SCNC: 12 MMOL/L (ref 8–16)
AST SERPL-CCNC: 22 U/L (ref 10–40)
BARBITURATES UR QL SCN>200 NG/ML: NEGATIVE
BASOPHILS # BLD AUTO: 0.04 K/UL (ref 0–0.2)
BASOPHILS NFR BLD: 0.3 % (ref 0–1.9)
BENZODIAZ UR QL SCN>200 NG/ML: NEGATIVE
BILIRUB SERPL-MCNC: 0.2 MG/DL (ref 0.1–1)
BLD GP AB SCN CELLS X3 SERPL QL: NORMAL
BUN SERPL-MCNC: 13 MG/DL (ref 6–20)
BZE UR QL SCN: NEGATIVE
CALCIUM SERPL-MCNC: 8.6 MG/DL (ref 8.7–10.5)
CANNABINOIDS UR QL SCN: NEGATIVE
CHLORIDE SERPL-SCNC: 104 MMOL/L (ref 95–110)
CO2 SERPL-SCNC: 19 MMOL/L (ref 23–29)
CREAT SERPL-MCNC: 0.6 MG/DL (ref 0.5–1.4)
CREAT UR-MCNC: 74.1 MG/DL (ref 15–325)
CREAT UR-MCNC: 74.1 MG/DL (ref 15–325)
DIFFERENTIAL METHOD: ABNORMAL
EOSINOPHIL # BLD AUTO: 0.1 K/UL (ref 0–0.5)
EOSINOPHIL NFR BLD: 0.7 % (ref 0–8)
ERYTHROCYTE [DISTWIDTH] IN BLOOD BY AUTOMATED COUNT: 13.9 % (ref 11.5–14.5)
EST. GFR  (AFRICAN AMERICAN): >60 ML/MIN/1.73 M^2
EST. GFR  (NON AFRICAN AMERICAN): >60 ML/MIN/1.73 M^2
GLUCOSE SERPL-MCNC: 77 MG/DL (ref 70–110)
HCT VFR BLD AUTO: 29.9 % (ref 37–48.5)
HGB BLD-MCNC: 9.7 G/DL (ref 12–16)
IMM GRANULOCYTES # BLD AUTO: 0.06 K/UL (ref 0–0.04)
IMM GRANULOCYTES NFR BLD AUTO: 0.5 % (ref 0–0.5)
LYMPHOCYTES # BLD AUTO: 2.8 K/UL (ref 1–4.8)
LYMPHOCYTES NFR BLD: 21.8 % (ref 18–48)
MCH RBC QN AUTO: 24.9 PG (ref 27–31)
MCHC RBC AUTO-ENTMCNC: 32.4 G/DL (ref 32–36)
MCV RBC AUTO: 77 FL (ref 82–98)
METHADONE UR QL SCN>300 NG/ML: NEGATIVE
MONOCYTES # BLD AUTO: 0.7 K/UL (ref 0.3–1)
MONOCYTES NFR BLD: 5.1 % (ref 4–15)
NEUTROPHILS # BLD AUTO: 9.3 K/UL (ref 1.8–7.7)
NEUTROPHILS NFR BLD: 71.6 % (ref 38–73)
NRBC BLD-RTO: 0 /100 WBC
OPIATES UR QL SCN: NEGATIVE
PCP UR QL SCN>25 NG/ML: NEGATIVE
PLATELET # BLD AUTO: 271 K/UL (ref 150–450)
PMV BLD AUTO: 9.9 FL (ref 9.2–12.9)
POTASSIUM SERPL-SCNC: 3.5 MMOL/L (ref 3.5–5.1)
PROT SERPL-MCNC: 6.6 G/DL (ref 6–8.4)
PROT UR-MCNC: 13 MG/DL (ref 0–15)
PROT/CREAT UR: 0.18 MG/G{CREAT} (ref 0–0.2)
RBC # BLD AUTO: 3.89 M/UL (ref 4–5.4)
SARS-COV-2 RDRP RESP QL NAA+PROBE: NEGATIVE
SODIUM SERPL-SCNC: 135 MMOL/L (ref 136–145)
TOXICOLOGY INFORMATION: ABNORMAL
WBC # BLD AUTO: 13.01 K/UL (ref 3.9–12.7)

## 2021-10-21 PROCEDURE — 84156 ASSAY OF PROTEIN URINE: CPT

## 2021-10-21 PROCEDURE — 80053 COMPREHEN METABOLIC PANEL: CPT

## 2021-10-21 PROCEDURE — 59025 FETAL NON-STRESS TEST: CPT | Mod: 26,,, | Performed by: OBSTETRICS & GYNECOLOGY

## 2021-10-21 PROCEDURE — 59025 PR FETAL 2N-STRESS TEST: ICD-10-PCS | Mod: 26,,, | Performed by: OBSTETRICS & GYNECOLOGY

## 2021-10-21 PROCEDURE — 85025 COMPLETE CBC W/AUTO DIFF WBC: CPT

## 2021-10-21 PROCEDURE — 99285 EMERGENCY DEPT VISIT HI MDM: CPT | Mod: 25

## 2021-10-21 PROCEDURE — U0002 COVID-19 LAB TEST NON-CDC: HCPCS | Performed by: STUDENT IN AN ORGANIZED HEALTH CARE EDUCATION/TRAINING PROGRAM

## 2021-10-21 PROCEDURE — 59025 FETAL NON-STRESS TEST: CPT

## 2021-10-21 PROCEDURE — 11000001 HC ACUTE MED/SURG PRIVATE ROOM

## 2021-10-21 PROCEDURE — 99283 EMERGENCY DEPT VISIT LOW MDM: CPT | Mod: 25,,, | Performed by: OBSTETRICS & GYNECOLOGY

## 2021-10-21 PROCEDURE — 96372 THER/PROPH/DIAG INJ SC/IM: CPT | Mod: 59

## 2021-10-21 PROCEDURE — 99283 PR EMERGENCY DEPT VISIT,LEVEL III: ICD-10-PCS | Mod: 25,,, | Performed by: OBSTETRICS & GYNECOLOGY

## 2021-10-21 PROCEDURE — 86900 BLOOD TYPING SEROLOGIC ABO: CPT | Performed by: OBSTETRICS & GYNECOLOGY

## 2021-10-21 PROCEDURE — 80307 DRUG TEST PRSMV CHEM ANLYZR: CPT

## 2021-10-21 PROCEDURE — 59025 FETAL NON-STRESS TEST: CPT | Mod: 76

## 2021-10-21 PROCEDURE — 63600175 PHARM REV CODE 636 W HCPCS: Performed by: STUDENT IN AN ORGANIZED HEALTH CARE EDUCATION/TRAINING PROGRAM

## 2021-10-21 RX ORDER — OXYTOCIN/RINGER'S LACTATE 30/500 ML
334 PLASTIC BAG, INJECTION (ML) INTRAVENOUS ONCE
Status: DISCONTINUED | OUTPATIENT
Start: 2021-10-21 | End: 2021-10-22 | Stop reason: HOSPADM

## 2021-10-21 RX ORDER — METHYLERGONOVINE MALEATE 0.2 MG/ML
200 INJECTION INTRAVENOUS
Status: DISCONTINUED | OUTPATIENT
Start: 2021-10-21 | End: 2021-10-22 | Stop reason: HOSPADM

## 2021-10-21 RX ORDER — DIPHENHYDRAMINE HYDROCHLORIDE 50 MG/ML
25 INJECTION INTRAMUSCULAR; INTRAVENOUS EVERY 4 HOURS PRN
Status: DISCONTINUED | OUTPATIENT
Start: 2021-10-21 | End: 2021-10-22 | Stop reason: HOSPADM

## 2021-10-21 RX ORDER — LORAZEPAM 0.5 MG/1
0.5 TABLET ORAL 2 TIMES DAILY PRN
Status: DISCONTINUED | OUTPATIENT
Start: 2021-10-21 | End: 2021-10-22 | Stop reason: HOSPADM

## 2021-10-21 RX ORDER — OXYTOCIN/RINGER'S LACTATE 30/500 ML
95 PLASTIC BAG, INJECTION (ML) INTRAVENOUS ONCE
Status: DISCONTINUED | OUTPATIENT
Start: 2021-10-21 | End: 2021-10-22 | Stop reason: HOSPADM

## 2021-10-21 RX ORDER — SODIUM CITRATE AND CITRIC ACID MONOHYDRATE 334; 500 MG/5ML; MG/5ML
30 SOLUTION ORAL
Status: DISCONTINUED | OUTPATIENT
Start: 2021-10-21 | End: 2021-10-22 | Stop reason: HOSPADM

## 2021-10-21 RX ORDER — PROCHLORPERAZINE EDISYLATE 5 MG/ML
5 INJECTION INTRAMUSCULAR; INTRAVENOUS EVERY 6 HOURS PRN
Status: DISCONTINUED | OUTPATIENT
Start: 2021-10-21 | End: 2021-10-22 | Stop reason: HOSPADM

## 2021-10-21 RX ORDER — SODIUM CHLORIDE 0.9 % (FLUSH) 0.9 %
10 SYRINGE (ML) INJECTION
Status: DISCONTINUED | OUTPATIENT
Start: 2021-10-21 | End: 2021-10-22 | Stop reason: HOSPADM

## 2021-10-21 RX ORDER — AMOXICILLIN 250 MG
1 CAPSULE ORAL NIGHTLY PRN
Status: DISCONTINUED | OUTPATIENT
Start: 2021-10-21 | End: 2021-10-22 | Stop reason: HOSPADM

## 2021-10-21 RX ORDER — DIPHENHYDRAMINE HCL 25 MG
25 CAPSULE ORAL EVERY 4 HOURS PRN
Status: DISCONTINUED | OUTPATIENT
Start: 2021-10-21 | End: 2021-10-22 | Stop reason: HOSPADM

## 2021-10-21 RX ORDER — FAMOTIDINE 10 MG/ML
20 INJECTION INTRAVENOUS
Status: DISCONTINUED | OUTPATIENT
Start: 2021-10-21 | End: 2021-10-22 | Stop reason: HOSPADM

## 2021-10-21 RX ORDER — ACETAMINOPHEN 325 MG/1
650 TABLET ORAL EVERY 6 HOURS PRN
Status: DISCONTINUED | OUTPATIENT
Start: 2021-10-21 | End: 2021-10-22 | Stop reason: HOSPADM

## 2021-10-21 RX ORDER — LAMOTRIGINE 100 MG/1
100 TABLET ORAL DAILY
Status: DISCONTINUED | OUTPATIENT
Start: 2021-10-22 | End: 2021-10-22 | Stop reason: HOSPADM

## 2021-10-21 RX ORDER — SIMETHICONE 80 MG
1 TABLET,CHEWABLE ORAL EVERY 6 HOURS PRN
Status: DISCONTINUED | OUTPATIENT
Start: 2021-10-21 | End: 2021-10-22 | Stop reason: HOSPADM

## 2021-10-21 RX ORDER — MISOPROSTOL 200 UG/1
800 TABLET ORAL
Status: DISCONTINUED | OUTPATIENT
Start: 2021-10-21 | End: 2021-10-22 | Stop reason: HOSPADM

## 2021-10-21 RX ORDER — ONDANSETRON 8 MG/1
8 TABLET, ORALLY DISINTEGRATING ORAL EVERY 8 HOURS PRN
Status: DISCONTINUED | OUTPATIENT
Start: 2021-10-21 | End: 2021-10-22 | Stop reason: HOSPADM

## 2021-10-21 RX ORDER — BETAMETHASONE SODIUM PHOSPHATE AND BETAMETHASONE ACETATE 3; 3 MG/ML; MG/ML
12 INJECTION, SUSPENSION INTRA-ARTICULAR; INTRALESIONAL; INTRAMUSCULAR; SOFT TISSUE EVERY 24 HOURS
Status: COMPLETED | OUTPATIENT
Start: 2021-10-21 | End: 2021-10-22

## 2021-10-21 RX ORDER — CEFAZOLIN SODIUM 2 G/50ML
2 SOLUTION INTRAVENOUS ONCE AS NEEDED
Status: DISCONTINUED | OUTPATIENT
Start: 2021-10-22 | End: 2021-10-22 | Stop reason: HOSPADM

## 2021-10-21 RX ORDER — PRENATAL WITH FERROUS FUM AND FOLIC ACID 3080; 920; 120; 400; 22; 1.84; 3; 20; 10; 1; 12; 200; 27; 25; 2 [IU]/1; [IU]/1; MG/1; [IU]/1; MG/1; MG/1; MG/1; MG/1; MG/1; MG/1; UG/1; MG/1; MG/1; MG/1; MG/1
1 TABLET ORAL DAILY
Status: DISCONTINUED | OUTPATIENT
Start: 2021-10-22 | End: 2021-10-22 | Stop reason: HOSPADM

## 2021-10-21 RX ORDER — CARBOPROST TROMETHAMINE 250 UG/ML
250 INJECTION, SOLUTION INTRAMUSCULAR
Status: DISCONTINUED | OUTPATIENT
Start: 2021-10-21 | End: 2021-10-22 | Stop reason: HOSPADM

## 2021-10-21 RX ORDER — SODIUM CHLORIDE, SODIUM LACTATE, POTASSIUM CHLORIDE, CALCIUM CHLORIDE 600; 310; 30; 20 MG/100ML; MG/100ML; MG/100ML; MG/100ML
INJECTION, SOLUTION INTRAVENOUS CONTINUOUS
Status: DISCONTINUED | OUTPATIENT
Start: 2021-10-22 | End: 2021-10-22 | Stop reason: HOSPADM

## 2021-10-21 RX ADMIN — BETAMETHASONE SODIUM PHOSPHATE AND BETAMETHASONE ACETATE 12 MG: 3; 3 INJECTION, SUSPENSION INTRA-ARTICULAR; INTRALESIONAL; INTRAMUSCULAR at 08:10

## 2021-10-22 ENCOUNTER — ANESTHESIA (OUTPATIENT)
Dept: OBSTETRICS AND GYNECOLOGY | Facility: OTHER | Age: 36
End: 2021-10-22

## 2021-10-22 ENCOUNTER — PATIENT MESSAGE (OUTPATIENT)
Dept: OBSTETRICS AND GYNECOLOGY | Facility: CLINIC | Age: 36
End: 2021-10-22
Payer: MEDICAID

## 2021-10-22 ENCOUNTER — ANESTHESIA EVENT (OUTPATIENT)
Dept: OBSTETRICS AND GYNECOLOGY | Facility: OTHER | Age: 36
End: 2021-10-22

## 2021-10-22 VITALS
HEART RATE: 105 BPM | RESPIRATION RATE: 16 BRPM | DIASTOLIC BLOOD PRESSURE: 73 MMHG | SYSTOLIC BLOOD PRESSURE: 133 MMHG | OXYGEN SATURATION: 98 % | TEMPERATURE: 98 F

## 2021-10-22 PROBLEM — O36.5990 PREGNANCY AFFECTED BY FETAL GROWTH RESTRICTION: Status: ACTIVE | Noted: 2021-10-22

## 2021-10-22 PROCEDURE — 25000003 PHARM REV CODE 250: Performed by: STUDENT IN AN ORGANIZED HEALTH CARE EDUCATION/TRAINING PROGRAM

## 2021-10-22 PROCEDURE — 99238 HOSP IP/OBS DSCHRG MGMT 30/<: CPT | Mod: 25,,, | Performed by: OBSTETRICS & GYNECOLOGY

## 2021-10-22 PROCEDURE — 76820 UMBILICAL ARTERY ECHO: CPT | Mod: 26,,, | Performed by: OBSTETRICS & GYNECOLOGY

## 2021-10-22 PROCEDURE — 76820 PR US, OB DOPPLER, FETAL UMBILICAL ARTERY ECHO: ICD-10-PCS | Mod: 26,,, | Performed by: OBSTETRICS & GYNECOLOGY

## 2021-10-22 PROCEDURE — 59025 PR FETAL 2N-STRESS TEST: ICD-10-PCS | Mod: 26,,, | Performed by: OBSTETRICS & GYNECOLOGY

## 2021-10-22 PROCEDURE — 76816 PR  US,PREGNANT UTERUS,F/U,TRANSABD APP: ICD-10-PCS | Mod: 26,,, | Performed by: OBSTETRICS & GYNECOLOGY

## 2021-10-22 PROCEDURE — 99238 PR HOSPITAL DISCHARGE DAY,<30 MIN: ICD-10-PCS | Mod: 25,,, | Performed by: OBSTETRICS & GYNECOLOGY

## 2021-10-22 PROCEDURE — 59025 FETAL NON-STRESS TEST: CPT | Mod: 26,,, | Performed by: OBSTETRICS & GYNECOLOGY

## 2021-10-22 PROCEDURE — 76816 OB US FOLLOW-UP PER FETUS: CPT | Mod: 26,,, | Performed by: OBSTETRICS & GYNECOLOGY

## 2021-10-22 PROCEDURE — 63600175 PHARM REV CODE 636 W HCPCS: Performed by: STUDENT IN AN ORGANIZED HEALTH CARE EDUCATION/TRAINING PROGRAM

## 2021-10-22 RX ORDER — TALC
9 POWDER (GRAM) TOPICAL NIGHTLY PRN
Status: DISCONTINUED | OUTPATIENT
Start: 2021-10-22 | End: 2021-10-22 | Stop reason: HOSPADM

## 2021-10-22 RX ORDER — LORAZEPAM 0.5 MG/1
0.5 TABLET ORAL ONCE
Status: COMPLETED | OUTPATIENT
Start: 2021-10-22 | End: 2021-10-22

## 2021-10-22 RX ADMIN — BETAMETHASONE SODIUM PHOSPHATE AND BETAMETHASONE ACETATE 12 MG: 3; 3 INJECTION, SUSPENSION INTRA-ARTICULAR; INTRALESIONAL; INTRAMUSCULAR at 06:10

## 2021-10-22 RX ADMIN — LAMOTRIGINE 100 MG: 100 TABLET ORAL at 12:10

## 2021-10-22 RX ADMIN — LORAZEPAM 0.5 MG: 0.5 TABLET ORAL at 10:10

## 2021-10-22 RX ADMIN — PRENATAL VIT W/ FE FUMARATE-FA TAB 27-0.8 MG 1 TABLET: 27-0.8 TAB at 12:10

## 2021-10-22 RX ADMIN — Medication 9 MG: at 05:10

## 2021-10-22 RX ADMIN — FAMOTIDINE 20 MG: 10 INJECTION INTRAVENOUS at 04:10

## 2021-10-28 ENCOUNTER — HOSPITAL ENCOUNTER (OUTPATIENT)
Dept: PERINATAL CARE | Facility: OTHER | Age: 36
Discharge: HOME | End: 2021-10-28
Attending: OBSTETRICS & GYNECOLOGY
Payer: MEDICAID

## 2021-10-28 DIAGNOSIS — Z3A.28 28 WEEKS GESTATION OF PREGNANCY: ICD-10-CM

## 2021-10-28 PROCEDURE — 59025 FETAL NON-STRESS TEST: CPT

## 2021-10-28 PROCEDURE — 76815 OB US LIMITED FETUS(S): CPT

## 2021-10-28 PROCEDURE — 76820 UMBILICAL ARTERY ECHO: CPT

## 2021-10-29 ENCOUNTER — ROUTINE PRENATAL (OUTPATIENT)
Dept: OBSTETRICS AND GYNECOLOGY | Facility: CLINIC | Age: 36
End: 2021-10-29
Payer: MEDICAID

## 2021-10-29 VITALS
BODY MASS INDEX: 26.87 KG/M2 | DIASTOLIC BLOOD PRESSURE: 85 MMHG | SYSTOLIC BLOOD PRESSURE: 135 MMHG | WEIGHT: 166.44 LBS

## 2021-10-29 DIAGNOSIS — Z3A.35 35 WEEKS GESTATION OF PREGNANCY: Primary | ICD-10-CM

## 2021-10-29 DIAGNOSIS — R03.0 ELEVATED BLOOD PRESSURE READING: ICD-10-CM

## 2021-10-29 PROCEDURE — 99212 OFFICE O/P EST SF 10 MIN: CPT | Mod: PBBFAC | Performed by: OBSTETRICS & GYNECOLOGY

## 2021-10-29 PROCEDURE — 99999 PR PBB SHADOW E&M-EST. PATIENT-LVL II: CPT | Mod: PBBFAC,,, | Performed by: OBSTETRICS & GYNECOLOGY

## 2021-10-29 PROCEDURE — 99999 PR PBB SHADOW E&M-EST. PATIENT-LVL II: ICD-10-PCS | Mod: PBBFAC,,, | Performed by: OBSTETRICS & GYNECOLOGY

## 2021-10-29 PROCEDURE — 99213 OFFICE O/P EST LOW 20 MIN: CPT | Mod: TH,S$PBB,, | Performed by: OBSTETRICS & GYNECOLOGY

## 2021-10-29 PROCEDURE — 99213 PR OFFICE/OUTPT VISIT, EST, LEVL III, 20-29 MIN: ICD-10-PCS | Mod: TH,S$PBB,, | Performed by: OBSTETRICS & GYNECOLOGY

## 2021-10-29 PROCEDURE — 87081 CULTURE SCREEN ONLY: CPT | Performed by: OBSTETRICS & GYNECOLOGY

## 2021-11-01 LAB — BACTERIA SPEC AEROBE CULT: NORMAL

## 2021-11-04 ENCOUNTER — HOSPITAL ENCOUNTER (EMERGENCY)
Facility: OTHER | Age: 36
Discharge: HOME | End: 2021-11-04
Attending: OBSTETRICS & GYNECOLOGY
Payer: MEDICAID

## 2021-11-04 ENCOUNTER — HOSPITAL ENCOUNTER (OUTPATIENT)
Dept: PERINATAL CARE | Facility: OTHER | Age: 36
Discharge: HOME | End: 2021-11-04
Attending: OBSTETRICS & GYNECOLOGY
Payer: MEDICAID

## 2021-11-04 VITALS
OXYGEN SATURATION: 95 % | HEART RATE: 97 BPM | RESPIRATION RATE: 18 BRPM | SYSTOLIC BLOOD PRESSURE: 122 MMHG | TEMPERATURE: 98 F | DIASTOLIC BLOOD PRESSURE: 74 MMHG

## 2021-11-04 DIAGNOSIS — Z3A.36 36 WEEKS GESTATION OF PREGNANCY: ICD-10-CM

## 2021-11-04 DIAGNOSIS — O28.9 ABNORMAL FINDINGS ON PRENATAL SCREENING: Primary | ICD-10-CM

## 2021-11-04 DIAGNOSIS — Z3A.28 28 WEEKS GESTATION OF PREGNANCY: ICD-10-CM

## 2021-11-04 DIAGNOSIS — O10.919 CHRONIC HYPERTENSION AFFECTING PREGNANCY: ICD-10-CM

## 2021-11-04 PROCEDURE — 99283 EMERGENCY DEPT VISIT LOW MDM: CPT | Mod: 25,,, | Performed by: OBSTETRICS & GYNECOLOGY

## 2021-11-04 PROCEDURE — 99283 PR EMERGENCY DEPT VISIT,LEVEL III: ICD-10-PCS | Mod: 25,,, | Performed by: OBSTETRICS & GYNECOLOGY

## 2021-11-04 PROCEDURE — 59025 PR FETAL 2N-STRESS TEST: ICD-10-PCS | Mod: 26,,, | Performed by: OBSTETRICS & GYNECOLOGY

## 2021-11-04 PROCEDURE — 99284 EMERGENCY DEPT VISIT MOD MDM: CPT | Mod: 25

## 2021-11-04 PROCEDURE — 59025 FETAL NON-STRESS TEST: CPT | Mod: 26,,, | Performed by: OBSTETRICS & GYNECOLOGY

## 2021-11-04 PROCEDURE — 76819 FETAL BIOPHYS PROFIL W/O NST: CPT

## 2021-11-04 PROCEDURE — 59025 FETAL NON-STRESS TEST: CPT

## 2021-11-05 ENCOUNTER — PATIENT MESSAGE (OUTPATIENT)
Dept: OBSTETRICS AND GYNECOLOGY | Facility: CLINIC | Age: 36
End: 2021-11-05
Payer: MEDICAID

## 2021-11-06 ENCOUNTER — PATIENT MESSAGE (OUTPATIENT)
Dept: OBSTETRICS AND GYNECOLOGY | Facility: CLINIC | Age: 36
End: 2021-11-06
Payer: MEDICAID

## 2021-11-09 ENCOUNTER — TELEPHONE (OUTPATIENT)
Dept: OBSTETRICS AND GYNECOLOGY | Facility: CLINIC | Age: 36
End: 2021-11-09
Payer: MEDICAID

## 2021-11-11 ENCOUNTER — PATIENT MESSAGE (OUTPATIENT)
Dept: OBSTETRICS AND GYNECOLOGY | Facility: CLINIC | Age: 36
End: 2021-11-11
Payer: MEDICAID

## 2021-11-12 ENCOUNTER — PATIENT MESSAGE (OUTPATIENT)
Dept: PSYCHIATRY | Facility: CLINIC | Age: 36
End: 2021-11-12
Payer: MEDICAID

## 2021-11-12 ENCOUNTER — HOSPITAL ENCOUNTER (OUTPATIENT)
Dept: PERINATAL CARE | Facility: OTHER | Age: 36
Discharge: HOME | End: 2021-11-12
Attending: OBSTETRICS & GYNECOLOGY
Payer: MEDICAID

## 2021-11-12 DIAGNOSIS — O10.919 CHRONIC HYPERTENSION AFFECTING PREGNANCY: ICD-10-CM

## 2021-11-12 PROCEDURE — 76819 FETAL BIOPHYS PROFIL W/O NST: CPT | Mod: 26,,, | Performed by: OBSTETRICS & GYNECOLOGY

## 2021-11-12 PROCEDURE — 76820 UMBILICAL ARTERY ECHO: CPT

## 2021-11-12 PROCEDURE — 76820 PRENATAL TESTING - NST/AFI: ICD-10-PCS | Mod: 26,,, | Performed by: OBSTETRICS & GYNECOLOGY

## 2021-11-12 PROCEDURE — 76819 PRENATAL TESTING - NST/AFI: ICD-10-PCS | Mod: 26,,, | Performed by: OBSTETRICS & GYNECOLOGY

## 2021-11-12 PROCEDURE — 76820 UMBILICAL ARTERY ECHO: CPT | Mod: 26,,, | Performed by: OBSTETRICS & GYNECOLOGY

## 2021-11-12 PROCEDURE — 76819 FETAL BIOPHYS PROFIL W/O NST: CPT

## 2021-11-15 ENCOUNTER — TELEPHONE (OUTPATIENT)
Dept: PSYCHIATRY | Facility: CLINIC | Age: 36
End: 2021-11-15
Payer: MEDICAID

## 2021-11-16 ENCOUNTER — PATIENT MESSAGE (OUTPATIENT)
Dept: PSYCHIATRY | Facility: CLINIC | Age: 36
End: 2021-11-16
Payer: MEDICAID

## 2021-11-17 ENCOUNTER — PATIENT MESSAGE (OUTPATIENT)
Dept: MATERNAL FETAL MEDICINE | Facility: CLINIC | Age: 36
End: 2021-11-17
Payer: MEDICAID

## 2021-11-17 ENCOUNTER — PATIENT MESSAGE (OUTPATIENT)
Dept: PSYCHIATRY | Facility: CLINIC | Age: 36
End: 2021-11-17
Payer: MEDICAID

## 2021-11-18 ENCOUNTER — PROCEDURE VISIT (OUTPATIENT)
Dept: MATERNAL FETAL MEDICINE | Facility: CLINIC | Age: 36
End: 2021-11-18
Payer: MEDICAID

## 2021-11-18 ENCOUNTER — HOSPITAL ENCOUNTER (OUTPATIENT)
Dept: PERINATAL CARE | Facility: OTHER | Age: 36
Discharge: HOME | End: 2021-11-18
Attending: OBSTETRICS & GYNECOLOGY
Payer: MEDICAID

## 2021-11-18 ENCOUNTER — ROUTINE PRENATAL (OUTPATIENT)
Dept: OBSTETRICS AND GYNECOLOGY | Facility: CLINIC | Age: 36
End: 2021-11-18
Payer: MEDICAID

## 2021-11-18 VITALS — WEIGHT: 172.81 LBS | BODY MASS INDEX: 27.9 KG/M2 | DIASTOLIC BLOOD PRESSURE: 63 MMHG | SYSTOLIC BLOOD PRESSURE: 114 MMHG

## 2021-11-18 DIAGNOSIS — O36.5990 PREGNANCY AFFECTED BY FETAL GROWTH RESTRICTION: ICD-10-CM

## 2021-11-18 DIAGNOSIS — Z3A.28 28 WEEKS GESTATION OF PREGNANCY: ICD-10-CM

## 2021-11-18 DIAGNOSIS — Z36.89 ENCOUNTER FOR ULTRASOUND TO CHECK FETAL GROWTH: ICD-10-CM

## 2021-11-18 DIAGNOSIS — Z3A.38 38 WEEKS GESTATION OF PREGNANCY: Primary | ICD-10-CM

## 2021-11-18 PROCEDURE — 76820 UMBILICAL ARTERY ECHO: CPT | Mod: 26,S$PBB,, | Performed by: OBSTETRICS & GYNECOLOGY

## 2021-11-18 PROCEDURE — 76816 OB US FOLLOW-UP PER FETUS: CPT | Mod: PBBFAC | Performed by: OBSTETRICS & GYNECOLOGY

## 2021-11-18 PROCEDURE — 76816 US MFM PROCEDURE (VIEWPOINT): ICD-10-PCS | Mod: 26,S$PBB,, | Performed by: OBSTETRICS & GYNECOLOGY

## 2021-11-18 PROCEDURE — 99212 OFFICE O/P EST SF 10 MIN: CPT | Mod: PBBFAC,TH,25 | Performed by: OBSTETRICS & GYNECOLOGY

## 2021-11-18 PROCEDURE — 99213 OFFICE O/P EST LOW 20 MIN: CPT | Mod: S$PBB,TH,, | Performed by: OBSTETRICS & GYNECOLOGY

## 2021-11-18 PROCEDURE — 76819 FETAL BIOPHYS PROFIL W/O NST: CPT | Mod: 26,S$PBB,, | Performed by: OBSTETRICS & GYNECOLOGY

## 2021-11-18 PROCEDURE — 99999 PR PBB SHADOW E&M-EST. PATIENT-LVL II: ICD-10-PCS | Mod: PBBFAC,,, | Performed by: OBSTETRICS & GYNECOLOGY

## 2021-11-18 PROCEDURE — 99213 PR OFFICE/OUTPT VISIT, EST, LEVL III, 20-29 MIN: ICD-10-PCS | Mod: S$PBB,TH,, | Performed by: OBSTETRICS & GYNECOLOGY

## 2021-11-18 PROCEDURE — 76820 US MFM PROCEDURE (VIEWPOINT): ICD-10-PCS | Mod: 26,S$PBB,, | Performed by: OBSTETRICS & GYNECOLOGY

## 2021-11-18 PROCEDURE — 76819 US MFM PROCEDURE (VIEWPOINT): ICD-10-PCS | Mod: 26,S$PBB,, | Performed by: OBSTETRICS & GYNECOLOGY

## 2021-11-18 PROCEDURE — 99999 PR PBB SHADOW E&M-EST. PATIENT-LVL II: CPT | Mod: PBBFAC,,, | Performed by: OBSTETRICS & GYNECOLOGY

## 2021-11-18 RX ORDER — OXYTOCIN/RINGER'S LACTATE 30/500 ML
95 PLASTIC BAG, INJECTION (ML) INTRAVENOUS ONCE
Status: CANCELLED | OUTPATIENT
Start: 2021-11-18 | End: 2021-11-18

## 2021-11-18 RX ORDER — SODIUM CHLORIDE 9 MG/ML
INJECTION, SOLUTION INTRAVENOUS
Status: CANCELLED | OUTPATIENT
Start: 2021-11-18

## 2021-11-18 RX ORDER — MISOPROSTOL 100 UG/1
800 TABLET ORAL
Status: CANCELLED | OUTPATIENT
Start: 2021-11-18

## 2021-11-18 RX ORDER — CARBOPROST TROMETHAMINE 250 UG/ML
250 INJECTION, SOLUTION INTRAMUSCULAR
Status: CANCELLED | OUTPATIENT
Start: 2021-11-18

## 2021-11-18 RX ORDER — ONDANSETRON 4 MG/1
8 TABLET, ORALLY DISINTEGRATING ORAL EVERY 8 HOURS PRN
Status: CANCELLED | OUTPATIENT
Start: 2021-11-18

## 2021-11-18 RX ORDER — DIPHENOXYLATE HYDROCHLORIDE AND ATROPINE SULFATE 2.5; .025 MG/1; MG/1
1 TABLET ORAL 4 TIMES DAILY PRN
Status: CANCELLED | OUTPATIENT
Start: 2021-11-18

## 2021-11-18 RX ORDER — SODIUM CHLORIDE, SODIUM LACTATE, POTASSIUM CHLORIDE, CALCIUM CHLORIDE 600; 310; 30; 20 MG/100ML; MG/100ML; MG/100ML; MG/100ML
INJECTION, SOLUTION INTRAVENOUS CONTINUOUS
Status: CANCELLED | OUTPATIENT
Start: 2021-11-18

## 2021-11-18 RX ORDER — MUPIROCIN 20 MG/G
OINTMENT TOPICAL
Status: CANCELLED | OUTPATIENT
Start: 2021-11-18

## 2021-11-18 RX ORDER — OXYTOCIN/RINGER'S LACTATE 30/500 ML
334 PLASTIC BAG, INJECTION (ML) INTRAVENOUS ONCE
Status: CANCELLED | OUTPATIENT
Start: 2021-11-18 | End: 2021-11-18

## 2021-11-18 RX ORDER — METHYLERGONOVINE MALEATE 0.2 MG/ML
200 INJECTION INTRAVENOUS
Status: CANCELLED | OUTPATIENT
Start: 2021-11-18

## 2021-11-18 RX ORDER — SIMETHICONE 80 MG
1 TABLET,CHEWABLE ORAL 4 TIMES DAILY PRN
Status: CANCELLED | OUTPATIENT
Start: 2021-11-18

## 2021-11-18 RX ORDER — PROCHLORPERAZINE EDISYLATE 5 MG/ML
5 INJECTION INTRAMUSCULAR; INTRAVENOUS EVERY 6 HOURS PRN
Status: CANCELLED | OUTPATIENT
Start: 2021-11-18

## 2021-11-18 RX ORDER — CALCIUM CARBONATE 200(500)MG
500 TABLET,CHEWABLE ORAL 3 TIMES DAILY PRN
Status: CANCELLED | OUTPATIENT
Start: 2021-11-18

## 2021-11-19 ENCOUNTER — ANESTHESIA EVENT (OUTPATIENT)
Dept: OBSTETRICS AND GYNECOLOGY | Facility: OTHER | Age: 36
End: 2021-11-19
Payer: MEDICAID

## 2021-11-19 ENCOUNTER — ANESTHESIA (OUTPATIENT)
Dept: OBSTETRICS AND GYNECOLOGY | Facility: OTHER | Age: 36
End: 2021-11-19
Payer: MEDICAID

## 2021-11-19 ENCOUNTER — HOSPITAL ENCOUNTER (INPATIENT)
Facility: OTHER | Age: 36
LOS: 4 days | Discharge: HOME | End: 2021-11-23
Attending: OBSTETRICS & GYNECOLOGY | Admitting: OBSTETRICS & GYNECOLOGY
Payer: MEDICAID

## 2021-11-19 DIAGNOSIS — O36.5990 PREGNANCY AFFECTED BY FETAL GROWTH RESTRICTION: ICD-10-CM

## 2021-11-19 DIAGNOSIS — Z3A.38 38 WEEKS GESTATION OF PREGNANCY: ICD-10-CM

## 2021-11-19 DIAGNOSIS — Z30.09 UNWANTED FERTILITY: ICD-10-CM

## 2021-11-19 PROBLEM — O99.323 SUBSTANCE ABUSE AFFECTING PREGNANCY IN THIRD TRIMESTER, ANTEPARTUM: Status: ACTIVE | Noted: 2021-11-19

## 2021-11-19 PROBLEM — O99.333 MATERNAL TOBACCO USE IN THIRD TRIMESTER: Status: ACTIVE | Noted: 2021-11-19

## 2021-11-19 PROBLEM — D25.9 UTERINE FIBROID DURING PREGNANCY, ANTEPARTUM: Status: ACTIVE | Noted: 2021-11-19

## 2021-11-19 PROBLEM — F19.10 SUBSTANCE ABUSE AFFECTING PREGNANCY IN THIRD TRIMESTER, ANTEPARTUM: Status: ACTIVE | Noted: 2021-11-19

## 2021-11-19 PROBLEM — O99.019 ANEMIA AFFECTING FIRST PREGNANCY: Status: ACTIVE | Noted: 2021-11-19

## 2021-11-19 PROBLEM — O34.10 UTERINE FIBROID DURING PREGNANCY, ANTEPARTUM: Status: ACTIVE | Noted: 2021-11-19

## 2021-11-19 LAB
ABO + RH BLD: NORMAL
ALBUMIN SERPL BCP-MCNC: 2.4 G/DL (ref 3.5–5.2)
ALP SERPL-CCNC: 148 U/L (ref 55–135)
ALT SERPL W/O P-5'-P-CCNC: 15 U/L (ref 10–44)
AMPHET+METHAMPHET UR QL: ABNORMAL
ANION GAP SERPL CALC-SCNC: 8 MMOL/L (ref 8–16)
AST SERPL-CCNC: 18 U/L (ref 10–40)
BARBITURATES UR QL SCN>200 NG/ML: NEGATIVE
BASOPHILS # BLD AUTO: 0.02 K/UL (ref 0–0.2)
BASOPHILS NFR BLD: 0.3 % (ref 0–1.9)
BENZODIAZ UR QL SCN>200 NG/ML: NEGATIVE
BILIRUB SERPL-MCNC: 0.2 MG/DL (ref 0.1–1)
BLD GP AB SCN CELLS X3 SERPL QL: NORMAL
BUN SERPL-MCNC: 12 MG/DL (ref 6–20)
BZE UR QL SCN: NEGATIVE
CALCIUM SERPL-MCNC: 8.4 MG/DL (ref 8.7–10.5)
CANNABINOIDS UR QL SCN: NEGATIVE
CHLORIDE SERPL-SCNC: 107 MMOL/L (ref 95–110)
CO2 SERPL-SCNC: 21 MMOL/L (ref 23–29)
CREAT SERPL-MCNC: 0.7 MG/DL (ref 0.5–1.4)
CREAT UR-MCNC: 83.4 MG/DL (ref 15–325)
DIFFERENTIAL METHOD: ABNORMAL
EOSINOPHIL # BLD AUTO: 0.1 K/UL (ref 0–0.5)
EOSINOPHIL NFR BLD: 1.5 % (ref 0–8)
ERYTHROCYTE [DISTWIDTH] IN BLOOD BY AUTOMATED COUNT: 16.3 % (ref 11.5–14.5)
EST. GFR  (AFRICAN AMERICAN): >60 ML/MIN/1.73 M^2
EST. GFR  (NON AFRICAN AMERICAN): >60 ML/MIN/1.73 M^2
GLUCOSE SERPL-MCNC: 109 MG/DL (ref 70–110)
HCT VFR BLD AUTO: 29 % (ref 37–48.5)
HGB BLD-MCNC: 9.2 G/DL (ref 12–16)
IMM GRANULOCYTES # BLD AUTO: 0.02 K/UL (ref 0–0.04)
IMM GRANULOCYTES NFR BLD AUTO: 0.3 % (ref 0–0.5)
LYMPHOCYTES # BLD AUTO: 1.8 K/UL (ref 1–4.8)
LYMPHOCYTES NFR BLD: 23.3 % (ref 18–48)
MCH RBC QN AUTO: 24.4 PG (ref 27–31)
MCHC RBC AUTO-ENTMCNC: 31.7 G/DL (ref 32–36)
MCV RBC AUTO: 77 FL (ref 82–98)
METHADONE UR QL SCN>300 NG/ML: NEGATIVE
MONOCYTES # BLD AUTO: 0.4 K/UL (ref 0.3–1)
MONOCYTES NFR BLD: 5.1 % (ref 4–15)
NEUTROPHILS # BLD AUTO: 5.5 K/UL (ref 1.8–7.7)
NEUTROPHILS NFR BLD: 69.5 % (ref 38–73)
NRBC BLD-RTO: 0 /100 WBC
OPIATES UR QL SCN: NEGATIVE
PCP UR QL SCN>25 NG/ML: NEGATIVE
PLATELET # BLD AUTO: 236 K/UL (ref 150–450)
PMV BLD AUTO: 10.2 FL (ref 9.2–12.9)
POTASSIUM SERPL-SCNC: 3.7 MMOL/L (ref 3.5–5.1)
PROT SERPL-MCNC: 6 G/DL (ref 6–8.4)
RBC # BLD AUTO: 3.77 M/UL (ref 4–5.4)
SARS-COV-2 RDRP RESP QL NAA+PROBE: NEGATIVE
SODIUM SERPL-SCNC: 136 MMOL/L (ref 136–145)
TOXICOLOGY INFORMATION: ABNORMAL
WBC # BLD AUTO: 7.85 K/UL (ref 3.9–12.7)

## 2021-11-19 PROCEDURE — 80307 DRUG TEST PRSMV CHEM ANLYZR: CPT | Performed by: OBSTETRICS & GYNECOLOGY

## 2021-11-19 PROCEDURE — U0002 COVID-19 LAB TEST NON-CDC: HCPCS | Performed by: OBSTETRICS & GYNECOLOGY

## 2021-11-19 PROCEDURE — 25000003 PHARM REV CODE 250: Performed by: OBSTETRICS & GYNECOLOGY

## 2021-11-19 PROCEDURE — 63600175 PHARM REV CODE 636 W HCPCS: Performed by: OBSTETRICS & GYNECOLOGY

## 2021-11-19 PROCEDURE — 63600175 PHARM REV CODE 636 W HCPCS: Performed by: STUDENT IN AN ORGANIZED HEALTH CARE EDUCATION/TRAINING PROGRAM

## 2021-11-19 PROCEDURE — 86900 BLOOD TYPING SEROLOGIC ABO: CPT | Performed by: OBSTETRICS & GYNECOLOGY

## 2021-11-19 PROCEDURE — 85025 COMPLETE CBC W/AUTO DIFF WBC: CPT | Performed by: OBSTETRICS & GYNECOLOGY

## 2021-11-19 PROCEDURE — 27200710 HC EPIDURAL INFUSION PUMP SET: Performed by: ANESTHESIOLOGY

## 2021-11-19 PROCEDURE — 51702 INSERT TEMP BLADDER CATH: CPT

## 2021-11-19 PROCEDURE — C1751 CATH, INF, PER/CENT/MIDLINE: HCPCS | Performed by: ANESTHESIOLOGY

## 2021-11-19 PROCEDURE — 25000003 PHARM REV CODE 250: Performed by: STUDENT IN AN ORGANIZED HEALTH CARE EDUCATION/TRAINING PROGRAM

## 2021-11-19 PROCEDURE — 25000003 PHARM REV CODE 250

## 2021-11-19 PROCEDURE — 11000001 HC ACUTE MED/SURG PRIVATE ROOM

## 2021-11-19 PROCEDURE — 86920 COMPATIBILITY TEST SPIN: CPT

## 2021-11-19 PROCEDURE — 59200 INSERT CERVICAL DILATOR: CPT

## 2021-11-19 PROCEDURE — 59409 PRA ETRICAL CARE,VAG DELIV ONLY: ICD-10-PCS | Mod: AA,,, | Performed by: ANESTHESIOLOGY

## 2021-11-19 PROCEDURE — 72100002 HC LABOR CARE, 1ST 8 HOURS

## 2021-11-19 PROCEDURE — 59409 OBSTETRICAL CARE: CPT | Mod: AA,,, | Performed by: ANESTHESIOLOGY

## 2021-11-19 PROCEDURE — 62326 NJX INTERLAMINAR LMBR/SAC: CPT | Performed by: STUDENT IN AN ORGANIZED HEALTH CARE EDUCATION/TRAINING PROGRAM

## 2021-11-19 PROCEDURE — 80053 COMPREHEN METABOLIC PANEL: CPT | Performed by: OBSTETRICS & GYNECOLOGY

## 2021-11-19 RX ORDER — SODIUM CITRATE AND CITRIC ACID MONOHYDRATE 334; 500 MG/5ML; MG/5ML
30 SOLUTION ORAL ONCE
Status: CANCELLED | OUTPATIENT
Start: 2021-11-19 | End: 2021-11-19

## 2021-11-19 RX ORDER — SODIUM CHLORIDE 9 MG/ML
INJECTION, SOLUTION INTRAVENOUS
Status: DISCONTINUED | OUTPATIENT
Start: 2021-11-19 | End: 2021-11-20

## 2021-11-19 RX ORDER — CALCIUM CARBONATE 200(500)MG
500 TABLET,CHEWABLE ORAL 3 TIMES DAILY PRN
Status: DISCONTINUED | OUTPATIENT
Start: 2021-11-19 | End: 2021-11-20

## 2021-11-19 RX ORDER — LAMOTRIGINE 100 MG/1
100 TABLET ORAL DAILY
Status: DISCONTINUED | OUTPATIENT
Start: 2021-11-19 | End: 2021-11-23 | Stop reason: HOSPADM

## 2021-11-19 RX ORDER — MISOPROSTOL 200 UG/1
800 TABLET ORAL
Status: DISCONTINUED | OUTPATIENT
Start: 2021-11-19 | End: 2021-11-20

## 2021-11-19 RX ORDER — OXYTOCIN/RINGER'S LACTATE 30/500 ML
95 PLASTIC BAG, INJECTION (ML) INTRAVENOUS ONCE
Status: COMPLETED | OUTPATIENT
Start: 2021-11-19 | End: 2021-11-20

## 2021-11-19 RX ORDER — BUPIVACAINE HYDROCHLORIDE 2.5 MG/ML
INJECTION, SOLUTION EPIDURAL; INFILTRATION; INTRACAUDAL
Status: DISPENSED
Start: 2021-11-19 | End: 2021-11-20

## 2021-11-19 RX ORDER — FENTANYL/BUPIVACAINE/NS/PF 2MCG/ML-.1
PLASTIC BAG, INJECTION (ML) INJECTION CONTINUOUS
Status: CANCELLED | OUTPATIENT
Start: 2021-11-19

## 2021-11-19 RX ORDER — PROCHLORPERAZINE EDISYLATE 5 MG/ML
5 INJECTION INTRAMUSCULAR; INTRAVENOUS EVERY 6 HOURS PRN
Status: DISCONTINUED | OUTPATIENT
Start: 2021-11-19 | End: 2021-11-20

## 2021-11-19 RX ORDER — OXYTOCIN/RINGER'S LACTATE 30/500 ML
0-30 PLASTIC BAG, INJECTION (ML) INTRAVENOUS CONTINUOUS
Status: DISCONTINUED | OUTPATIENT
Start: 2021-11-19 | End: 2021-11-20

## 2021-11-19 RX ORDER — SODIUM CHLORIDE, SODIUM LACTATE, POTASSIUM CHLORIDE, CALCIUM CHLORIDE 600; 310; 30; 20 MG/100ML; MG/100ML; MG/100ML; MG/100ML
INJECTION, SOLUTION INTRAVENOUS CONTINUOUS
Status: DISCONTINUED | OUTPATIENT
Start: 2021-11-19 | End: 2021-11-20

## 2021-11-19 RX ORDER — FENTANYL CITRATE 50 UG/ML
INJECTION, SOLUTION INTRAMUSCULAR; INTRAVENOUS
Status: COMPLETED
Start: 2021-11-19 | End: 2021-11-19

## 2021-11-19 RX ORDER — TRANEXAMIC ACID 100 MG/ML
1000 INJECTION, SOLUTION INTRAVENOUS ONCE AS NEEDED
Status: DISCONTINUED | OUTPATIENT
Start: 2021-11-19 | End: 2021-11-20

## 2021-11-19 RX ORDER — METHYLERGONOVINE MALEATE 0.2 MG/ML
200 INJECTION INTRAVENOUS
Status: DISCONTINUED | OUTPATIENT
Start: 2021-11-19 | End: 2021-11-20

## 2021-11-19 RX ORDER — FAMOTIDINE 10 MG/ML
20 INJECTION INTRAVENOUS ONCE
Status: CANCELLED | OUTPATIENT
Start: 2021-11-19 | End: 2021-11-19

## 2021-11-19 RX ORDER — CARBOPROST TROMETHAMINE 250 UG/ML
250 INJECTION, SOLUTION INTRAMUSCULAR
Status: DISCONTINUED | OUTPATIENT
Start: 2021-11-19 | End: 2021-11-20

## 2021-11-19 RX ORDER — LAMOTRIGINE 100 MG/1
100 TABLET ORAL DAILY
Status: DISCONTINUED | OUTPATIENT
Start: 2021-11-20 | End: 2021-11-19

## 2021-11-19 RX ORDER — SIMETHICONE 80 MG
1 TABLET,CHEWABLE ORAL 4 TIMES DAILY PRN
Status: DISCONTINUED | OUTPATIENT
Start: 2021-11-19 | End: 2021-11-20

## 2021-11-19 RX ORDER — LIDOCAINE HYDROCHLORIDE AND EPINEPHRINE 15; 5 MG/ML; UG/ML
INJECTION, SOLUTION EPIDURAL
Status: DISCONTINUED | OUTPATIENT
Start: 2021-11-19 | End: 2021-11-20

## 2021-11-19 RX ORDER — LORAZEPAM 0.5 MG/1
0.5 TABLET ORAL 2 TIMES DAILY PRN
Status: DISCONTINUED | OUTPATIENT
Start: 2021-11-19 | End: 2021-11-23 | Stop reason: HOSPADM

## 2021-11-19 RX ORDER — FENTANYL/BUPIVACAINE/NS/PF 2MCG/ML-.1
PLASTIC BAG, INJECTION (ML) INJECTION CONTINUOUS PRN
Status: DISCONTINUED | OUTPATIENT
Start: 2021-11-19 | End: 2021-11-20

## 2021-11-19 RX ORDER — MUPIROCIN 20 MG/G
OINTMENT TOPICAL
Status: DISCONTINUED | OUTPATIENT
Start: 2021-11-19 | End: 2021-11-20

## 2021-11-19 RX ORDER — DIPHENOXYLATE HYDROCHLORIDE AND ATROPINE SULFATE 2.5; .025 MG/1; MG/1
1 TABLET ORAL 4 TIMES DAILY PRN
Status: DISCONTINUED | OUTPATIENT
Start: 2021-11-19 | End: 2021-11-20

## 2021-11-19 RX ORDER — FENTANYL CITRATE 50 UG/ML
INJECTION, SOLUTION INTRAMUSCULAR; INTRAVENOUS
Status: DISCONTINUED | OUTPATIENT
Start: 2021-11-19 | End: 2021-11-20

## 2021-11-19 RX ORDER — OXYTOCIN/RINGER'S LACTATE 30/500 ML
334 PLASTIC BAG, INJECTION (ML) INTRAVENOUS ONCE
Status: COMPLETED | OUTPATIENT
Start: 2021-11-19 | End: 2021-11-20

## 2021-11-19 RX ORDER — FENTANYL/BUPIVACAINE/NS/PF 2MCG/ML-.1
PLASTIC BAG, INJECTION (ML) INJECTION
Status: COMPLETED
Start: 2021-11-19 | End: 2021-11-19

## 2021-11-19 RX ORDER — ONDANSETRON 8 MG/1
8 TABLET, ORALLY DISINTEGRATING ORAL EVERY 8 HOURS PRN
Status: DISCONTINUED | OUTPATIENT
Start: 2021-11-19 | End: 2021-11-20

## 2021-11-19 RX ADMIN — LAMOTRIGINE 100 MG: 100 TABLET ORAL at 05:11

## 2021-11-19 RX ADMIN — Medication 10 ML: at 07:11

## 2021-11-19 RX ADMIN — LIDOCAINE HYDROCHLORIDE,EPINEPHRINE BITARTRATE 3 ML: 15; .005 INJECTION, SOLUTION EPIDURAL; INFILTRATION; INTRACAUDAL; PERINEURAL at 07:11

## 2021-11-19 RX ADMIN — MISOPROSTOL 50 MCG: 100 TABLET ORAL at 10:11

## 2021-11-19 RX ADMIN — Medication 10 ML/HR: at 07:11

## 2021-11-19 RX ADMIN — MISOPROSTOL 50 MCG: 100 TABLET ORAL at 03:11

## 2021-11-19 RX ADMIN — SODIUM CHLORIDE, SODIUM LACTATE, POTASSIUM CHLORIDE, AND CALCIUM CHLORIDE: .6; .31; .03; .02 INJECTION, SOLUTION INTRAVENOUS at 09:11

## 2021-11-19 RX ADMIN — Medication 2 MILLI-UNITS/MIN: at 09:11

## 2021-11-19 RX ADMIN — LORAZEPAM 0.5 MG: 0.5 TABLET ORAL at 05:11

## 2021-11-19 RX ADMIN — FENTANYL CITRATE 100 MCG: 50 INJECTION, SOLUTION INTRAMUSCULAR; INTRAVENOUS at 07:11

## 2021-11-20 PROBLEM — O09.513 PRIMIGRAVIDA OF ADVANCED MATERNAL AGE IN THIRD TRIMESTER: Status: RESOLVED | Noted: 2021-10-21 | Resolved: 2021-11-20

## 2021-11-20 PROBLEM — O34.10 UTERINE FIBROID DURING PREGNANCY, ANTEPARTUM: Status: RESOLVED | Noted: 2021-11-19 | Resolved: 2021-11-20

## 2021-11-20 PROBLEM — O36.5990 PREGNANCY AFFECTED BY FETAL GROWTH RESTRICTION: Status: RESOLVED | Noted: 2021-10-22 | Resolved: 2021-11-20

## 2021-11-20 PROBLEM — D25.9 UTERINE FIBROID DURING PREGNANCY, ANTEPARTUM: Status: RESOLVED | Noted: 2021-11-19 | Resolved: 2021-11-20

## 2021-11-20 LAB
BASOPHILS # BLD AUTO: 0.03 K/UL (ref 0–0.2)
BASOPHILS NFR BLD: 0.2 % (ref 0–1.9)
DIFFERENTIAL METHOD: ABNORMAL
EOSINOPHIL # BLD AUTO: 0.1 K/UL (ref 0–0.5)
EOSINOPHIL NFR BLD: 0.7 % (ref 0–8)
ERYTHROCYTE [DISTWIDTH] IN BLOOD BY AUTOMATED COUNT: 16.3 % (ref 11.5–14.5)
HCT VFR BLD AUTO: 26.2 % (ref 37–48.5)
HGB BLD-MCNC: 8.2 G/DL (ref 12–16)
IMM GRANULOCYTES # BLD AUTO: 0.04 K/UL (ref 0–0.04)
IMM GRANULOCYTES NFR BLD AUTO: 0.3 % (ref 0–0.5)
LYMPHOCYTES # BLD AUTO: 2.6 K/UL (ref 1–4.8)
LYMPHOCYTES NFR BLD: 20.9 % (ref 18–48)
MCH RBC QN AUTO: 24.3 PG (ref 27–31)
MCHC RBC AUTO-ENTMCNC: 31.3 G/DL (ref 32–36)
MCV RBC AUTO: 78 FL (ref 82–98)
MONOCYTES # BLD AUTO: 0.7 K/UL (ref 0.3–1)
MONOCYTES NFR BLD: 5.6 % (ref 4–15)
NEUTROPHILS # BLD AUTO: 9.1 K/UL (ref 1.8–7.7)
NEUTROPHILS NFR BLD: 72.3 % (ref 38–73)
NRBC BLD-RTO: 0 /100 WBC
PLATELET # BLD AUTO: 248 K/UL (ref 150–450)
PMV BLD AUTO: 10.2 FL (ref 9.2–12.9)
RBC # BLD AUTO: 3.38 M/UL (ref 4–5.4)
WBC # BLD AUTO: 12.54 K/UL (ref 3.9–12.7)

## 2021-11-20 PROCEDURE — 85025 COMPLETE CBC W/AUTO DIFF WBC: CPT | Performed by: OBSTETRICS & GYNECOLOGY

## 2021-11-20 PROCEDURE — 36415 COLL VENOUS BLD VENIPUNCTURE: CPT | Performed by: OBSTETRICS & GYNECOLOGY

## 2021-11-20 PROCEDURE — 11000001 HC ACUTE MED/SURG PRIVATE ROOM

## 2021-11-20 PROCEDURE — 59409 PR OBSTETRICAL CARE,VAG DELIV ONLY: ICD-10-PCS | Mod: AT,,, | Performed by: OBSTETRICS & GYNECOLOGY

## 2021-11-20 PROCEDURE — 25000003 PHARM REV CODE 250: Performed by: OBSTETRICS & GYNECOLOGY

## 2021-11-20 PROCEDURE — 25000003 PHARM REV CODE 250: Performed by: STUDENT IN AN ORGANIZED HEALTH CARE EDUCATION/TRAINING PROGRAM

## 2021-11-20 PROCEDURE — 63600175 PHARM REV CODE 636 W HCPCS: Performed by: OBSTETRICS & GYNECOLOGY

## 2021-11-20 PROCEDURE — 0502F SUBSEQUENT PRENATAL CARE: CPT | Mod: ,,, | Performed by: OBSTETRICS & GYNECOLOGY

## 2021-11-20 PROCEDURE — 72200005 HC VAGINAL DELIVERY LEVEL II

## 2021-11-20 PROCEDURE — 72100003 HC LABOR CARE, EA. ADDL. 8 HRS

## 2021-11-20 PROCEDURE — 0502F PR SUBSEQUENT PRENATAL CARE: ICD-10-PCS | Mod: ,,, | Performed by: OBSTETRICS & GYNECOLOGY

## 2021-11-20 PROCEDURE — 59409 OBSTETRICAL CARE: CPT | Mod: AT,,, | Performed by: OBSTETRICS & GYNECOLOGY

## 2021-11-20 PROCEDURE — 25000003 PHARM REV CODE 250

## 2021-11-20 RX ORDER — PRENATAL WITH FERROUS FUM AND FOLIC ACID 3080; 920; 120; 400; 22; 1.84; 3; 20; 10; 1; 12; 200; 27; 25; 2 [IU]/1; [IU]/1; MG/1; [IU]/1; MG/1; MG/1; MG/1; MG/1; MG/1; MG/1; UG/1; MG/1; MG/1; MG/1; MG/1
1 TABLET ORAL DAILY
Status: DISCONTINUED | OUTPATIENT
Start: 2021-11-20 | End: 2021-11-23 | Stop reason: HOSPADM

## 2021-11-20 RX ORDER — DOCUSATE SODIUM 100 MG/1
200 CAPSULE, LIQUID FILLED ORAL 2 TIMES DAILY PRN
Status: DISCONTINUED | OUTPATIENT
Start: 2021-11-20 | End: 2021-11-21

## 2021-11-20 RX ORDER — ACETAMINOPHEN 325 MG/1
650 TABLET ORAL EVERY 6 HOURS PRN
Status: DISCONTINUED | OUTPATIENT
Start: 2021-11-20 | End: 2021-11-23 | Stop reason: HOSPADM

## 2021-11-20 RX ORDER — MUPIROCIN 20 MG/G
OINTMENT TOPICAL
Status: CANCELLED | OUTPATIENT
Start: 2021-11-20

## 2021-11-20 RX ORDER — SIMETHICONE 80 MG
1 TABLET,CHEWABLE ORAL EVERY 6 HOURS PRN
Status: DISCONTINUED | OUTPATIENT
Start: 2021-11-20 | End: 2021-11-23 | Stop reason: HOSPADM

## 2021-11-20 RX ORDER — IBUPROFEN 600 MG/1
600 TABLET ORAL EVERY 6 HOURS PRN
Qty: 30 TABLET | Refills: 1 | Status: SHIPPED | OUTPATIENT
Start: 2021-11-20 | End: 2021-11-22

## 2021-11-20 RX ORDER — DIPHENHYDRAMINE HCL 25 MG
25 CAPSULE ORAL EVERY 4 HOURS PRN
Status: DISCONTINUED | OUTPATIENT
Start: 2021-11-20 | End: 2021-11-23 | Stop reason: HOSPADM

## 2021-11-20 RX ORDER — CALCIUM CARBONATE 200(500)MG
500 TABLET,CHEWABLE ORAL 3 TIMES DAILY PRN
Status: DISCONTINUED | OUTPATIENT
Start: 2021-11-20 | End: 2021-11-23 | Stop reason: HOSPADM

## 2021-11-20 RX ORDER — IBUPROFEN 600 MG/1
600 TABLET ORAL EVERY 6 HOURS
Status: DISCONTINUED | OUTPATIENT
Start: 2021-11-20 | End: 2021-11-23 | Stop reason: HOSPADM

## 2021-11-20 RX ORDER — HYDROCORTISONE 25 MG/G
CREAM TOPICAL 3 TIMES DAILY PRN
Status: DISCONTINUED | OUTPATIENT
Start: 2021-11-20 | End: 2021-11-23 | Stop reason: HOSPADM

## 2021-11-20 RX ORDER — DIPHENHYDRAMINE HYDROCHLORIDE 50 MG/ML
25 INJECTION INTRAMUSCULAR; INTRAVENOUS EVERY 4 HOURS PRN
Status: DISCONTINUED | OUTPATIENT
Start: 2021-11-20 | End: 2021-11-23 | Stop reason: HOSPADM

## 2021-11-20 RX ORDER — PROCHLORPERAZINE EDISYLATE 5 MG/ML
5 INJECTION INTRAMUSCULAR; INTRAVENOUS EVERY 6 HOURS PRN
Status: DISCONTINUED | OUTPATIENT
Start: 2021-11-20 | End: 2021-11-23 | Stop reason: HOSPADM

## 2021-11-20 RX ORDER — HYDROCODONE BITARTRATE AND ACETAMINOPHEN 10; 325 MG/1; MG/1
1 TABLET ORAL EVERY 4 HOURS PRN
Status: DISCONTINUED | OUTPATIENT
Start: 2021-11-20 | End: 2021-11-23 | Stop reason: HOSPADM

## 2021-11-20 RX ORDER — OXYTOCIN/RINGER'S LACTATE 30/500 ML
95 PLASTIC BAG, INJECTION (ML) INTRAVENOUS ONCE
Status: DISCONTINUED | OUTPATIENT
Start: 2021-11-20 | End: 2021-11-23 | Stop reason: HOSPADM

## 2021-11-20 RX ORDER — ONDANSETRON 8 MG/1
8 TABLET, ORALLY DISINTEGRATING ORAL EVERY 8 HOURS PRN
Status: DISCONTINUED | OUTPATIENT
Start: 2021-11-20 | End: 2021-11-23 | Stop reason: HOSPADM

## 2021-11-20 RX ORDER — SODIUM CHLORIDE 0.9 % (FLUSH) 0.9 %
10 SYRINGE (ML) INJECTION
Status: DISCONTINUED | OUTPATIENT
Start: 2021-11-20 | End: 2021-11-23 | Stop reason: HOSPADM

## 2021-11-20 RX ORDER — SODIUM CHLORIDE 9 MG/ML
INJECTION, SOLUTION INTRAVENOUS CONTINUOUS
Status: CANCELLED | OUTPATIENT
Start: 2021-11-20

## 2021-11-20 RX ORDER — HYDROCODONE BITARTRATE AND ACETAMINOPHEN 5; 325 MG/1; MG/1
1 TABLET ORAL EVERY 4 HOURS PRN
Status: DISCONTINUED | OUTPATIENT
Start: 2021-11-20 | End: 2021-11-23 | Stop reason: HOSPADM

## 2021-11-20 RX ADMIN — MISOPROSTOL 800 MCG: 200 TABLET ORAL at 05:11

## 2021-11-20 RX ADMIN — Medication 334 MILLI-UNITS/MIN: at 05:11

## 2021-11-20 RX ADMIN — CALCIUM CARBONATE (ANTACID) CHEW TAB 500 MG 500 MG: 500 CHEW TAB at 11:11

## 2021-11-20 RX ADMIN — IBUPROFEN 600 MG: 600 TABLET ORAL at 11:11

## 2021-11-20 RX ADMIN — IBUPROFEN 600 MG: 600 TABLET ORAL at 05:11

## 2021-11-20 RX ADMIN — PRENATAL VIT W/ FE FUMARATE-FA TAB 27-0.8 MG 1 TABLET: 27-0.8 TAB at 11:11

## 2021-11-20 RX ADMIN — DOCUSATE SODIUM 200 MG: 100 CAPSULE, LIQUID FILLED ORAL at 11:11

## 2021-11-20 RX ADMIN — HYDROCODONE BITARTRATE AND ACETAMINOPHEN 1 TABLET: 5; 325 TABLET ORAL at 11:11

## 2021-11-20 RX ADMIN — LAMOTRIGINE 100 MG: 100 TABLET ORAL at 11:11

## 2021-11-20 RX ADMIN — LORAZEPAM 0.5 MG: 0.5 TABLET ORAL at 11:11

## 2021-11-20 RX ADMIN — Medication 95 MILLI-UNITS/MIN: at 05:11

## 2021-11-20 RX ADMIN — HYDROCODONE BITARTRATE AND ACETAMINOPHEN 1 TABLET: 10; 325 TABLET ORAL at 05:11

## 2021-11-21 ENCOUNTER — ANESTHESIA (OUTPATIENT)
Dept: OBSTETRICS AND GYNECOLOGY | Facility: OTHER | Age: 36
End: 2021-11-21
Payer: MEDICAID

## 2021-11-21 PROCEDURE — 25000003 PHARM REV CODE 250: Performed by: STUDENT IN AN ORGANIZED HEALTH CARE EDUCATION/TRAINING PROGRAM

## 2021-11-21 PROCEDURE — 11000001 HC ACUTE MED/SURG PRIVATE ROOM

## 2021-11-21 PROCEDURE — 25000003 PHARM REV CODE 250

## 2021-11-21 PROCEDURE — 63600175 PHARM REV CODE 636 W HCPCS: Performed by: OBSTETRICS & GYNECOLOGY

## 2021-11-21 PROCEDURE — 27200688 HC TRAY, SPINAL-HYPER/ ISOBARIC: Performed by: STUDENT IN AN ORGANIZED HEALTH CARE EDUCATION/TRAINING PROGRAM

## 2021-11-21 RX ORDER — SODIUM CHLORIDE, SODIUM LACTATE, POTASSIUM CHLORIDE, CALCIUM CHLORIDE 600; 310; 30; 20 MG/100ML; MG/100ML; MG/100ML; MG/100ML
INJECTION, SOLUTION INTRAVENOUS CONTINUOUS
Status: DISCONTINUED | OUTPATIENT
Start: 2021-11-21 | End: 2021-11-22

## 2021-11-21 RX ORDER — LANOLIN ALCOHOL/MO/W.PET/CERES
1 CREAM (GRAM) TOPICAL DAILY
Status: DISCONTINUED | OUTPATIENT
Start: 2021-11-21 | End: 2021-11-23 | Stop reason: HOSPADM

## 2021-11-21 RX ORDER — DOCUSATE SODIUM 100 MG/1
200 CAPSULE, LIQUID FILLED ORAL 2 TIMES DAILY
Status: DISCONTINUED | OUTPATIENT
Start: 2021-11-21 | End: 2021-11-23 | Stop reason: HOSPADM

## 2021-11-21 RX ADMIN — DOCUSATE SODIUM 200 MG: 100 CAPSULE, LIQUID FILLED ORAL at 08:11

## 2021-11-21 RX ADMIN — PRENATAL VIT W/ FE FUMARATE-FA TAB 27-0.8 MG 1 TABLET: 27-0.8 TAB at 08:11

## 2021-11-21 RX ADMIN — FERROUS SULFATE TAB 325 MG (65 MG ELEMENTAL FE) 1 EACH: 325 (65 FE) TAB at 08:11

## 2021-11-21 RX ADMIN — SODIUM CHLORIDE, SODIUM LACTATE, POTASSIUM CHLORIDE, AND CALCIUM CHLORIDE: .6; .31; .03; .02 INJECTION, SOLUTION INTRAVENOUS at 01:11

## 2021-11-21 RX ADMIN — HYDROCODONE BITARTRATE AND ACETAMINOPHEN 1 TABLET: 5; 325 TABLET ORAL at 05:11

## 2021-11-21 RX ADMIN — LORAZEPAM 0.5 MG: 0.5 TABLET ORAL at 06:11

## 2021-11-21 RX ADMIN — IBUPROFEN 600 MG: 600 TABLET ORAL at 06:11

## 2021-11-21 RX ADMIN — IBUPROFEN 600 MG: 600 TABLET ORAL at 05:11

## 2021-11-21 RX ADMIN — LAMOTRIGINE 100 MG: 100 TABLET ORAL at 08:11

## 2021-11-21 RX ADMIN — HYDROCODONE BITARTRATE AND ACETAMINOPHEN 1 TABLET: 5; 325 TABLET ORAL at 04:11

## 2021-11-21 RX ADMIN — IBUPROFEN 600 MG: 600 TABLET ORAL at 11:11

## 2021-11-22 ENCOUNTER — ANESTHESIA EVENT (OUTPATIENT)
Dept: OBSTETRICS AND GYNECOLOGY | Facility: OTHER | Age: 36
End: 2021-11-22
Payer: MEDICAID

## 2021-11-22 PROBLEM — Z98.51 S/P TUBAL LIGATION: Status: ACTIVE | Noted: 2021-11-22

## 2021-11-22 PROCEDURE — 88302 TISSUE EXAM BY PATHOLOGIST: CPT | Mod: 26,,, | Performed by: STUDENT IN AN ORGANIZED HEALTH CARE EDUCATION/TRAINING PROGRAM

## 2021-11-22 PROCEDURE — 71000033 HC RECOVERY, INTIAL HOUR: Performed by: OBSTETRICS & GYNECOLOGY

## 2021-11-22 PROCEDURE — 11000001 HC ACUTE MED/SURG PRIVATE ROOM

## 2021-11-22 PROCEDURE — 37000008 HC ANESTHESIA 1ST 15 MINUTES: Mod: SZN | Performed by: OBSTETRICS & GYNECOLOGY

## 2021-11-22 PROCEDURE — 71000039 HC RECOVERY, EACH ADD'L HOUR: Performed by: OBSTETRICS & GYNECOLOGY

## 2021-11-22 PROCEDURE — 88302 PR  SURG PATH,LEVEL II: ICD-10-PCS | Mod: 26,,, | Performed by: STUDENT IN AN ORGANIZED HEALTH CARE EDUCATION/TRAINING PROGRAM

## 2021-11-22 PROCEDURE — 63600175 PHARM REV CODE 636 W HCPCS: Performed by: OBSTETRICS & GYNECOLOGY

## 2021-11-22 PROCEDURE — 37000009 HC ANESTHESIA EA ADD 15 MINS: Mod: SZN | Performed by: OBSTETRICS & GYNECOLOGY

## 2021-11-22 PROCEDURE — D9220A PRA ANESTHESIA: ICD-10-PCS | Mod: ,,, | Performed by: ANESTHESIOLOGY

## 2021-11-22 PROCEDURE — 25000003 PHARM REV CODE 250

## 2021-11-22 PROCEDURE — 99233 PR SUBSEQUENT HOSPITAL CARE,LEVL III: ICD-10-PCS | Mod: ,,, | Performed by: OBSTETRICS & GYNECOLOGY

## 2021-11-22 PROCEDURE — 25000003 PHARM REV CODE 250: Performed by: STUDENT IN AN ORGANIZED HEALTH CARE EDUCATION/TRAINING PROGRAM

## 2021-11-22 PROCEDURE — 88302 TISSUE EXAM BY PATHOLOGIST: CPT | Mod: 59 | Performed by: STUDENT IN AN ORGANIZED HEALTH CARE EDUCATION/TRAINING PROGRAM

## 2021-11-22 PROCEDURE — 36004723: Mod: SZN | Performed by: OBSTETRICS & GYNECOLOGY

## 2021-11-22 PROCEDURE — 63600175 PHARM REV CODE 636 W HCPCS

## 2021-11-22 PROCEDURE — 36004722: Mod: SZN | Performed by: OBSTETRICS & GYNECOLOGY

## 2021-11-22 PROCEDURE — 63600175 PHARM REV CODE 636 W HCPCS: Performed by: STUDENT IN AN ORGANIZED HEALTH CARE EDUCATION/TRAINING PROGRAM

## 2021-11-22 PROCEDURE — D9220A PRA ANESTHESIA: Mod: ,,, | Performed by: ANESTHESIOLOGY

## 2021-11-22 PROCEDURE — 99233 SBSQ HOSP IP/OBS HIGH 50: CPT | Mod: ,,, | Performed by: OBSTETRICS & GYNECOLOGY

## 2021-11-22 RX ORDER — IBUPROFEN 600 MG/1
600 TABLET ORAL EVERY 6 HOURS PRN
Qty: 30 TABLET | Refills: 1 | Status: SHIPPED | OUTPATIENT
Start: 2021-11-22 | End: 2022-09-22

## 2021-11-22 RX ORDER — SODIUM CITRATE AND CITRIC ACID MONOHYDRATE 334; 500 MG/5ML; MG/5ML
30 SOLUTION ORAL ONCE
Status: COMPLETED | OUTPATIENT
Start: 2021-11-22 | End: 2021-11-22

## 2021-11-22 RX ORDER — SODIUM CHLORIDE, SODIUM LACTATE, POTASSIUM CHLORIDE, CALCIUM CHLORIDE 600; 310; 30; 20 MG/100ML; MG/100ML; MG/100ML; MG/100ML
INJECTION, SOLUTION INTRAVENOUS CONTINUOUS
Status: DISCONTINUED | OUTPATIENT
Start: 2021-11-22 | End: 2021-11-22

## 2021-11-22 RX ORDER — HYDROCODONE BITARTRATE AND ACETAMINOPHEN 5; 325 MG/1; MG/1
1 TABLET ORAL EVERY 4 HOURS PRN
Qty: 15 TABLET | Refills: 0 | Status: SHIPPED | OUTPATIENT
Start: 2021-11-22 | End: 2022-03-23

## 2021-11-22 RX ORDER — FENTANYL CITRATE 50 UG/ML
INJECTION, SOLUTION INTRAMUSCULAR; INTRAVENOUS
Status: DISCONTINUED | OUTPATIENT
Start: 2021-11-22 | End: 2021-11-23

## 2021-11-22 RX ORDER — MIDAZOLAM HYDROCHLORIDE 1 MG/ML
INJECTION INTRAMUSCULAR; INTRAVENOUS
Status: DISCONTINUED | OUTPATIENT
Start: 2021-11-22 | End: 2021-11-23

## 2021-11-22 RX ORDER — DEXMEDETOMIDINE HYDROCHLORIDE 100 UG/ML
INJECTION, SOLUTION INTRAVENOUS
Status: DISCONTINUED | OUTPATIENT
Start: 2021-11-22 | End: 2021-11-23

## 2021-11-22 RX ORDER — DEXAMETHASONE SODIUM PHOSPHATE 4 MG/ML
INJECTION, SOLUTION INTRA-ARTICULAR; INTRALESIONAL; INTRAMUSCULAR; INTRAVENOUS; SOFT TISSUE
Status: DISCONTINUED | OUTPATIENT
Start: 2021-11-22 | End: 2021-11-23

## 2021-11-22 RX ORDER — DOCUSATE SODIUM 100 MG/1
200 CAPSULE, LIQUID FILLED ORAL 2 TIMES DAILY PRN
Qty: 30 CAPSULE | Refills: 3 | Status: SHIPPED | OUTPATIENT
Start: 2021-11-22 | End: 2022-03-23

## 2021-11-22 RX ORDER — FAMOTIDINE 10 MG/ML
20 INJECTION INTRAVENOUS ONCE
Status: COMPLETED | OUTPATIENT
Start: 2021-11-22 | End: 2021-11-22

## 2021-11-22 RX ORDER — SODIUM CHLORIDE, SODIUM LACTATE, POTASSIUM CHLORIDE, CALCIUM CHLORIDE 600; 310; 30; 20 MG/100ML; MG/100ML; MG/100ML; MG/100ML
INJECTION, SOLUTION INTRAVENOUS CONTINUOUS
Status: DISCONTINUED | OUTPATIENT
Start: 2021-11-22 | End: 2021-11-23 | Stop reason: HOSPADM

## 2021-11-22 RX ORDER — ONDANSETRON 2 MG/ML
INJECTION INTRAMUSCULAR; INTRAVENOUS
Status: DISCONTINUED | OUTPATIENT
Start: 2021-11-22 | End: 2021-11-23

## 2021-11-22 RX ORDER — BUPIVACAINE HYDROCHLORIDE 7.5 MG/ML
INJECTION, SOLUTION INTRASPINAL
Status: DISCONTINUED | OUTPATIENT
Start: 2021-11-22 | End: 2021-11-23

## 2021-11-22 RX ORDER — SODIUM CHLORIDE, SODIUM LACTATE, POTASSIUM CHLORIDE, CALCIUM CHLORIDE 600; 310; 30; 20 MG/100ML; MG/100ML; MG/100ML; MG/100ML
INJECTION, SOLUTION INTRAVENOUS CONTINUOUS PRN
Status: DISCONTINUED | OUTPATIENT
Start: 2021-11-22 | End: 2021-11-23

## 2021-11-22 RX ADMIN — FENTANYL CITRATE 25 MCG: 50 INJECTION, SOLUTION INTRAMUSCULAR; INTRAVENOUS at 04:11

## 2021-11-22 RX ADMIN — DEXMEDETOMIDINE HYDROCHLORIDE 8 MCG: 100 INJECTION, SOLUTION INTRAVENOUS at 04:11

## 2021-11-22 RX ADMIN — IBUPROFEN 600 MG: 600 TABLET ORAL at 01:11

## 2021-11-22 RX ADMIN — DOCUSATE SODIUM 200 MG: 100 CAPSULE, LIQUID FILLED ORAL at 08:11

## 2021-11-22 RX ADMIN — DEXAMETHASONE SODIUM PHOSPHATE 4 MG: 4 INJECTION INTRA-ARTICULAR; INTRALESIONAL; INTRAMUSCULAR; INTRAVENOUS; SOFT TISSUE at 04:11

## 2021-11-22 RX ADMIN — PRENATAL VIT W/ FE FUMARATE-FA TAB 27-0.8 MG 1 TABLET: 27-0.8 TAB at 08:11

## 2021-11-22 RX ADMIN — FENTANYL CITRATE 10 MCG: 50 INJECTION, SOLUTION INTRAMUSCULAR; INTRAVENOUS at 04:11

## 2021-11-22 RX ADMIN — LAMOTRIGINE 100 MG: 100 TABLET ORAL at 08:11

## 2021-11-22 RX ADMIN — FAMOTIDINE 20 MG: 10 INJECTION INTRAVENOUS at 03:11

## 2021-11-22 RX ADMIN — HYDROCODONE BITARTRATE AND ACETAMINOPHEN 1 TABLET: 10; 325 TABLET ORAL at 07:11

## 2021-11-22 RX ADMIN — FERROUS SULFATE TAB 325 MG (65 MG ELEMENTAL FE) 1 EACH: 325 (65 FE) TAB at 08:11

## 2021-11-22 RX ADMIN — IBUPROFEN 600 MG: 600 TABLET ORAL at 07:11

## 2021-11-22 RX ADMIN — SODIUM CHLORIDE, SODIUM LACTATE, POTASSIUM CHLORIDE, AND CALCIUM CHLORIDE: .6; .31; .03; .02 INJECTION, SOLUTION INTRAVENOUS at 04:11

## 2021-11-22 RX ADMIN — MIDAZOLAM HYDROCHLORIDE 2 MG: 1 INJECTION, SOLUTION INTRAMUSCULAR; INTRAVENOUS at 04:11

## 2021-11-22 RX ADMIN — LORAZEPAM 0.5 MG: 0.5 TABLET ORAL at 08:11

## 2021-11-22 RX ADMIN — FENTANYL CITRATE 40 MCG: 50 INJECTION, SOLUTION INTRAMUSCULAR; INTRAVENOUS at 04:11

## 2021-11-22 RX ADMIN — ONDANSETRON 4 MG: 2 INJECTION INTRAMUSCULAR; INTRAVENOUS at 04:11

## 2021-11-22 RX ADMIN — BUPIVACAINE HYDROCHLORIDE IN DEXTROSE 1.8 ML: 7.5 INJECTION, SOLUTION SUBARACHNOID at 04:11

## 2021-11-22 RX ADMIN — HYDROCODONE BITARTRATE AND ACETAMINOPHEN 1 TABLET: 5; 325 TABLET ORAL at 08:11

## 2021-11-22 RX ADMIN — SODIUM CHLORIDE, SODIUM LACTATE, POTASSIUM CHLORIDE, AND CALCIUM CHLORIDE: .6; .31; .03; .02 INJECTION, SOLUTION INTRAVENOUS at 03:11

## 2021-11-22 RX ADMIN — DOCUSATE SODIUM 200 MG: 100 CAPSULE, LIQUID FILLED ORAL at 07:11

## 2021-11-22 RX ADMIN — DIPHENHYDRAMINE HYDROCHLORIDE 25 MG: 50 INJECTION, SOLUTION INTRAMUSCULAR; INTRAVENOUS at 08:11

## 2021-11-22 RX ADMIN — SODIUM CHLORIDE, SODIUM LACTATE, POTASSIUM CHLORIDE, AND CALCIUM CHLORIDE: .6; .31; .03; .02 INJECTION, SOLUTION INTRAVENOUS at 08:11

## 2021-11-22 RX ADMIN — HYDROCODONE BITARTRATE AND ACETAMINOPHEN 1 TABLET: 5; 325 TABLET ORAL at 01:11

## 2021-11-22 RX ADMIN — IBUPROFEN 600 MG: 600 TABLET ORAL at 12:11

## 2021-11-22 RX ADMIN — IBUPROFEN 600 MG: 600 TABLET ORAL at 05:11

## 2021-11-22 RX ADMIN — SODIUM CITRATE AND CITRIC ACID MONOHYDRATE 30 ML: 500; 334 SOLUTION ORAL at 03:11

## 2021-11-23 VITALS
TEMPERATURE: 98 F | RESPIRATION RATE: 18 BRPM | OXYGEN SATURATION: 98 % | DIASTOLIC BLOOD PRESSURE: 70 MMHG | SYSTOLIC BLOOD PRESSURE: 126 MMHG | HEART RATE: 88 BPM

## 2021-11-23 LAB
BLD PROD TYP BPU: NORMAL
BLOOD UNIT EXPIRATION DATE: NORMAL
BLOOD UNIT TYPE CODE: 6200
BLOOD UNIT TYPE: NORMAL
CODING SYSTEM: NORMAL
DISPENSE STATUS: NORMAL
NUM UNITS TRANS PACKED RBC: NORMAL

## 2021-11-23 PROCEDURE — 25000003 PHARM REV CODE 250: Performed by: STUDENT IN AN ORGANIZED HEALTH CARE EDUCATION/TRAINING PROGRAM

## 2021-11-23 PROCEDURE — 58605 DIVISION OF FALLOPIAN TUBE: CPT | Mod: ,,, | Performed by: OBSTETRICS & GYNECOLOGY

## 2021-11-23 PROCEDURE — 25000003 PHARM REV CODE 250

## 2021-11-23 PROCEDURE — 99232 SBSQ HOSP IP/OBS MODERATE 35: CPT | Mod: ,,, | Performed by: OBSTETRICS & GYNECOLOGY

## 2021-11-23 PROCEDURE — 58605 PR LIGATE FALLOPIAN TUBE,POSTPARTUM: ICD-10-PCS | Mod: ,,, | Performed by: OBSTETRICS & GYNECOLOGY

## 2021-11-23 PROCEDURE — 99232 PR SUBSEQUENT HOSPITAL CARE,LEVL II: ICD-10-PCS | Mod: ,,, | Performed by: OBSTETRICS & GYNECOLOGY

## 2021-11-23 RX ORDER — IRON POLYSACCHARIDE COMPLEX 150 MG
150 CAPSULE ORAL DAILY
Qty: 30 CAPSULE | Refills: 3 | Status: SHIPPED | OUTPATIENT
Start: 2021-11-23 | End: 2022-09-22

## 2021-11-23 RX ADMIN — IBUPROFEN 600 MG: 600 TABLET ORAL at 08:11

## 2021-11-23 RX ADMIN — PRENATAL VIT W/ FE FUMARATE-FA TAB 27-0.8 MG 1 TABLET: 27-0.8 TAB at 08:11

## 2021-11-23 RX ADMIN — HYDROCODONE BITARTRATE AND ACETAMINOPHEN 1 TABLET: 10; 325 TABLET ORAL at 01:11

## 2021-11-23 RX ADMIN — HYDROCODONE BITARTRATE AND ACETAMINOPHEN 1 TABLET: 5; 325 TABLET ORAL at 04:11

## 2021-11-23 RX ADMIN — LORAZEPAM 0.5 MG: 0.5 TABLET ORAL at 04:11

## 2021-11-23 RX ADMIN — IBUPROFEN 600 MG: 600 TABLET ORAL at 01:11

## 2021-11-23 RX ADMIN — IBUPROFEN 600 MG: 600 TABLET ORAL at 04:11

## 2021-11-23 RX ADMIN — LAMOTRIGINE 100 MG: 100 TABLET ORAL at 08:11

## 2021-11-23 RX ADMIN — FERROUS SULFATE TAB 325 MG (65 MG ELEMENTAL FE) 1 EACH: 325 (65 FE) TAB at 08:11

## 2021-11-23 RX ADMIN — HYDROCODONE BITARTRATE AND ACETAMINOPHEN 1 TABLET: 5; 325 TABLET ORAL at 10:11

## 2021-11-23 RX ADMIN — DOCUSATE SODIUM 200 MG: 100 CAPSULE, LIQUID FILLED ORAL at 08:11

## 2021-11-23 RX ADMIN — HYDROCODONE BITARTRATE AND ACETAMINOPHEN 1 TABLET: 10; 325 TABLET ORAL at 05:11

## 2021-11-30 LAB
FINAL PATHOLOGIC DIAGNOSIS: NORMAL
Lab: NORMAL

## 2021-12-06 ENCOUNTER — OFFICE VISIT (OUTPATIENT)
Dept: OBSTETRICS AND GYNECOLOGY | Facility: CLINIC | Age: 36
End: 2021-12-06
Payer: MEDICAID

## 2021-12-06 VITALS
DIASTOLIC BLOOD PRESSURE: 91 MMHG | SYSTOLIC BLOOD PRESSURE: 131 MMHG | WEIGHT: 149.94 LBS | BODY MASS INDEX: 24.1 KG/M2 | HEIGHT: 66 IN

## 2021-12-06 DIAGNOSIS — Z13.31 POSITIVE DEPRESSION SCREENING: Primary | ICD-10-CM

## 2021-12-06 PROCEDURE — 99999 PR PBB SHADOW E&M-EST. PATIENT-LVL III: CPT | Mod: PBBFAC,,, | Performed by: OBSTETRICS & GYNECOLOGY

## 2021-12-06 PROCEDURE — 99213 OFFICE O/P EST LOW 20 MIN: CPT | Mod: PBBFAC | Performed by: OBSTETRICS & GYNECOLOGY

## 2021-12-06 PROCEDURE — 59430 PR CARE AFTER DELIVERY ONLY: ICD-10-PCS | Mod: TH,,, | Performed by: OBSTETRICS & GYNECOLOGY

## 2021-12-06 PROCEDURE — 99999 PR PBB SHADOW E&M-EST. PATIENT-LVL III: ICD-10-PCS | Mod: PBBFAC,,, | Performed by: OBSTETRICS & GYNECOLOGY

## 2021-12-06 RX ORDER — LORAZEPAM 0.5 MG/1
0.5 TABLET ORAL 2 TIMES DAILY PRN
Qty: 60 TABLET | Refills: 1 | Status: SHIPPED | OUTPATIENT
Start: 2021-12-06 | End: 2022-03-23 | Stop reason: SDUPTHER

## 2021-12-07 ENCOUNTER — OFFICE VISIT (OUTPATIENT)
Dept: PSYCHIATRY | Facility: CLINIC | Age: 36
End: 2021-12-07
Payer: MEDICAID

## 2021-12-07 ENCOUNTER — PATIENT MESSAGE (OUTPATIENT)
Dept: OBSTETRICS AND GYNECOLOGY | Facility: CLINIC | Age: 36
End: 2021-12-07
Payer: MEDICAID

## 2021-12-07 DIAGNOSIS — F41.1 GAD (GENERALIZED ANXIETY DISORDER): ICD-10-CM

## 2021-12-07 DIAGNOSIS — F31.9 BIPOLAR 1 DISORDER: Primary | ICD-10-CM

## 2021-12-07 PROCEDURE — 99214 OFFICE O/P EST MOD 30 MIN: CPT | Mod: SA,HB,95, | Performed by: PHYSICIAN ASSISTANT

## 2021-12-07 PROCEDURE — 90833 PSYTX W PT W E/M 30 MIN: CPT | Mod: SA,HB,95, | Performed by: PHYSICIAN ASSISTANT

## 2021-12-07 PROCEDURE — 99214 PR OFFICE/OUTPT VISIT, EST, LEVL IV, 30-39 MIN: ICD-10-PCS | Mod: SA,HB,95, | Performed by: PHYSICIAN ASSISTANT

## 2021-12-07 PROCEDURE — 90833 PR PSYCHOTHERAPY W/PATIENT W/E&M, 30 MIN (ADD ON): ICD-10-PCS | Mod: SA,HB,95, | Performed by: PHYSICIAN ASSISTANT

## 2021-12-07 RX ORDER — LAMOTRIGINE 100 MG/1
100 TABLET ORAL DAILY
Qty: 60 TABLET | Refills: 2 | Status: SHIPPED | OUTPATIENT
Start: 2021-12-07 | End: 2022-03-22 | Stop reason: DRUGHIGH

## 2021-12-27 ENCOUNTER — PATIENT MESSAGE (OUTPATIENT)
Dept: PSYCHIATRY | Facility: CLINIC | Age: 36
End: 2021-12-27
Payer: MEDICAID

## 2021-12-27 ENCOUNTER — OFFICE VISIT (OUTPATIENT)
Dept: PSYCHIATRY | Facility: CLINIC | Age: 36
End: 2021-12-27
Payer: MEDICAID

## 2021-12-27 DIAGNOSIS — F13.20 BENZODIAZEPINE DEPENDENCE: ICD-10-CM

## 2021-12-27 DIAGNOSIS — F31.9 BIPOLAR 1 DISORDER: Primary | ICD-10-CM

## 2021-12-27 PROCEDURE — 99499 UNLISTED E&M SERVICE: CPT | Mod: HP,HB,95, | Performed by: PSYCHOLOGIST

## 2021-12-27 PROCEDURE — 99499 NO LOS: ICD-10-PCS | Mod: HP,HB,95, | Performed by: PSYCHOLOGIST

## 2022-02-12 ENCOUNTER — PATIENT MESSAGE (OUTPATIENT)
Dept: OBSTETRICS AND GYNECOLOGY | Facility: CLINIC | Age: 37
End: 2022-02-12
Payer: MEDICAID

## 2022-02-16 ENCOUNTER — PATIENT MESSAGE (OUTPATIENT)
Dept: PSYCHIATRY | Facility: CLINIC | Age: 37
End: 2022-02-16
Payer: MEDICAID

## 2022-02-24 ENCOUNTER — OFFICE VISIT (OUTPATIENT)
Dept: PSYCHIATRY | Facility: CLINIC | Age: 37
End: 2022-02-24
Payer: MEDICAID

## 2022-02-24 DIAGNOSIS — F31.9 BIPOLAR 1 DISORDER: Primary | ICD-10-CM

## 2022-02-24 PROCEDURE — 90834 PR PSYCHOTHERAPY W/PATIENT, 45 MIN: ICD-10-PCS | Mod: AH,HB,95, | Performed by: PSYCHOLOGIST

## 2022-02-24 PROCEDURE — 90834 PSYTX W PT 45 MINUTES: CPT | Mod: AH,HB,95, | Performed by: PSYCHOLOGIST

## 2022-02-24 NOTE — PROGRESS NOTES
Individual Psychotherapy (PhD/LCSW)    2/24/2022    The patient location is: [place]  The chief complaint leading to consultation is: bipolar disorder, ppd  Visit type: audiovisual  Face to Face time with patient: 45 minutes of total time spent on the encounter, which includes face to face time and non-face to face time preparing to see the patient (eg, review of tests), Obtaining and/or reviewing separately obtained history, Documenting clinical information in the electronic or other health record, Independently interpreting results (not separately reported) and communicating results to the patient/family/caregiver, or Care coordination (not separately reported).   Each patient to whom he or she provides medical services by telemedicine is:  (1) informed of the relationship between the physician and patient and the respective role of any other health care provider with respect to management of the patient; and (2) notified that he or she may decline to receive medical services by telemedicine and may withdraw from such care at any time.    Therapeutic Intervention: Met with patient.  Outpatient - Supportive psychotherapy 45 min - CPT Code 28040 and Outpatient - Interactive psychotherapy 45 min - CPT code 17892    Chief complaint/reason for encounter: depression and mood swings     Interval history and content of current session: She presented from her virtual individual therapy session three months postpartum along with her daughter. She shared overall she has been doing okay in terms of caring for the baby and getting support from others. However, she has been experiencing crying spells for no identified reason and high anxiety with worries about the baby, such when she is asleep checking on her several times to make sure is breathing. She indicated she just recently started her Lamictal as she was forgetting to take it initially postpartum and needs to meet with her psychiatry provider for a possible medication  adjustment. She indicated her daughter's father (they are not in a relationship, only co-parenting) does not understand her emotional ups and downs and has difficulty being patient with her at times. She wish she had a partner who could provide her comfort. We discussed other places to get that including from her friends and family. She reported the baby is soon to be cleared by the early steps program which was required after a presumptive positive test of methamphetamine for Adderall when going into have the baby. She indicated her daughter is healthy and progressing well. We discussed positive self-care of making sure to take breaks for herself and spending time outside of the house taking walks over visiting family.    Treatment plan:  · Target symptoms: depression, mood disorder  · Why chosen therapy is appropriate versus another modality: relevant to diagnosis  · Outcome monitoring methods: self-report, observation  · Therapeutic intervention type: supportive psychotherapy, interactive psychotherapy    Risk parameters:  Patient reports no suicidal ideation  Patient reports no homicidal ideation  Patient reports no self-injurious behavior  Patient reports no violent behavior    Verbal deficits: None    Patient's response to intervention:  The patient's response to intervention is accepting.    Progress toward goals and other mental status changes:  The patient's progress toward goals is fair .    Mental Status Exam:  General Appearance:  unremarkable, age appropriate   Speech: normal tone, normal rate, normal pitch, normal volume      Level of Cooperation: cooperative      Thought Processes: normal and logical   Mood: sad      Thought Content: normal, no suicidality, no homicidality, delusions, or paranoia   Affect: congruent and appropriate   Orientation: Oriented x3   Attention Span & Concentration: intact   Judgment & Insight: fair     Language  intact     Diagnosis:     ICD-10-CM ICD-9-CM   1. Bipolar 1  disorder  F31.9 296.7   2. Postpartum depression  O99.345 648.44    F53.0 311     Plan:  individual psychotherapy and medication management by physician    Return to clinic: 1 month    Length of Service (minutes): 45

## 2022-03-14 ENCOUNTER — PATIENT MESSAGE (OUTPATIENT)
Dept: PSYCHIATRY | Facility: CLINIC | Age: 37
End: 2022-03-14
Payer: MEDICAID

## 2022-03-21 ENCOUNTER — PATIENT MESSAGE (OUTPATIENT)
Dept: PSYCHIATRY | Facility: CLINIC | Age: 37
End: 2022-03-21
Payer: MEDICAID

## 2022-03-22 ENCOUNTER — PATIENT MESSAGE (OUTPATIENT)
Dept: OBSTETRICS AND GYNECOLOGY | Facility: CLINIC | Age: 37
End: 2022-03-22
Payer: MEDICAID

## 2022-03-22 ENCOUNTER — OFFICE VISIT (OUTPATIENT)
Dept: PSYCHIATRY | Facility: CLINIC | Age: 37
End: 2022-03-22
Payer: MEDICAID

## 2022-03-22 DIAGNOSIS — F31.9 BIPOLAR 1 DISORDER: Primary | ICD-10-CM

## 2022-03-22 DIAGNOSIS — F41.1 GAD (GENERALIZED ANXIETY DISORDER): ICD-10-CM

## 2022-03-22 PROCEDURE — 1159F MED LIST DOCD IN RCRD: CPT | Mod: SA,HB,CPTII,95 | Performed by: PHYSICIAN ASSISTANT

## 2022-03-22 PROCEDURE — 99214 PR OFFICE/OUTPT VISIT, EST, LEVL IV, 30-39 MIN: ICD-10-PCS | Mod: SA,HB,95, | Performed by: PHYSICIAN ASSISTANT

## 2022-03-22 PROCEDURE — 99214 OFFICE O/P EST MOD 30 MIN: CPT | Mod: SA,HB,95, | Performed by: PHYSICIAN ASSISTANT

## 2022-03-22 PROCEDURE — 1160F RVW MEDS BY RX/DR IN RCRD: CPT | Mod: SA,HB,CPTII,95 | Performed by: PHYSICIAN ASSISTANT

## 2022-03-22 PROCEDURE — 1159F PR MEDICATION LIST DOCUMENTED IN MEDICAL RECORD: ICD-10-PCS | Mod: SA,HB,CPTII,95 | Performed by: PHYSICIAN ASSISTANT

## 2022-03-22 PROCEDURE — 1160F PR REVIEW ALL MEDS BY PRESCRIBER/CLIN PHARMACIST DOCUMENTED: ICD-10-PCS | Mod: SA,HB,CPTII,95 | Performed by: PHYSICIAN ASSISTANT

## 2022-03-22 RX ORDER — LAMOTRIGINE 150 MG/1
150 TABLET ORAL DAILY
Qty: 30 TABLET | Refills: 1 | Status: SHIPPED | OUTPATIENT
Start: 2022-03-22 | End: 2022-05-21

## 2022-03-22 NOTE — PROGRESS NOTES
"  The patient location is: friend's house, [facility]  The chief complaint leading to consultation is: follow up    Visit type: audio only    Face to Face time with patient: 20   minutes of total time spent on the encounter, which includes face to face time and non-face to face time preparing to see the patient (eg, review of tests), Obtaining and/or reviewing separately obtained history, Documenting clinical information in the electronic or other health record, Independently interpreting results (not separately reported) and communicating results to the patient/family/caregiver, or Care coordination (not separately reported).     Each patient to whom he or she provides medical services by telemedicine is:  (1) informed of the relationship between the physician and patient and the respective role of any other health care provider with respect to management of the patient; and (2) notified that he or she may decline to receive medical services by telemedicine and may withdraw from such care at any time.    Notes:   Outpatient Psychiatry Follow-Up Visit (PA)    3/22/2022    Clinical Status of Patient:  Outpatient (Ambulatory)    Chief Complaint:  Enriqueta Quiroz is a 36 y.o. female who presents today for follow-up of mood disorder.  Met with patient.      Current Medications:  Lamictal 100 mg  Ativan 0.5 mg    Interval History and Content of Current Session:  Interim Events/Subjective Report/Content of Current Session:      Patient is a 36 year old female with a psychaitric history of bipolar disorder, BREE,  PTSD, history of substance abuse and ADHD.    Patient presents for follow up for first time since 12/07/2021.     Patient reports that since delivering her child, she has enrolled in online classes. She reports the classes are "super fast" and that her time management is "not manageable." She reports struggling to keep up, particularly while caring for her 4 month old. She complains of not being " "able to sleep throughout the night.     Patient reports she is staying at her friend's house which has been a good environment for the baby. She still hopes to get her own place.     The patient continues to take lamictal 100 mg. She reports she lost the medication for a period and had to restart. She admits it "was bad when I didn't have the medication" and reports feeling "super depressed and super emotional." She reports this is new, as she usually is more manic than depressed. She also reports having her first menstrual cycle during this time and reports it was worse after the delivery. Patient reports doing better since re-starting the medication, but reports she is still more depressed than she was prior to having the baby.     The patient reports she is out of her Ativan. This has been prescribed by patient's OBGYN, patient reports she has not gotten a response. She is informed that this provider will have to contact the OBGYN before potentially prescribing. This provider attempts to contact OBGYN and is informed she has since left [facility]. This provider informs the patient that she will prescribe the ativan at this time.     Patient reports she has been in contact with Dr. Stephanie Ferro for therapy    Psychotherapy:  · Target symptoms: mood disorder  · Why chosen therapy is appropriate versus another modality: relevant to diagnosis  · Outcome monitoring methods: self-report  · Therapeutic intervention type: supportive psychotherapy  · Topics discussed/themes: relationships difficulties, building skills sets for symptom management  · The patient's response to the intervention is accepting. The patient's progress toward treatment goals is fair.   · Duration of intervention: 15 minutes.    Review of Systems   · PSYCHIATRIC: Pertinant items are noted in the narrative.    Past Medical, Family and Social History: The patient's past medical, family and social history have been reviewed and updated as appropriate " within the electronic medical record - see encounter notes.    Compliance: yes    Side effects: None    Risk Parameters:  Patient reports no suicidal ideation  Patient reports no homicidal ideation  Patient reports no self-injurious behavior  Patient reports no violent behavior    Exam (detailed: at least 9 elements; comprehensive: all 15 elements)   Constitutional  Vitals:  Most recent vital signs, dated greater than 90 days prior to this appointment, were reviewed.   There were no vitals filed for this visit.     General:  unremarkable, age appropriate     Musculoskeletal  Muscle Strength/Tone:  not examined   Gait & Station:  n/a virtual visit     Psychiatric  Speech:  no latency; no press   Mood & Affect:  steady  congruent and appropriate   Thought Process:  normal and logical   Associations:  intact   Thought Content:  normal, no suicidality, no homicidality, delusions, or paranoia   Insight:  intact, has awareness of illness   Judgement: behavior is adequate to circumstances   Orientation:  grossly intact   Memory: intact for content of interview   Language: grossly intact   Attention Span & Concentration:  able to focus   Fund of Knowledge:  intact and appropriate to age and level of education     Assessment and Diagnosis   Status/Progress: Based on the examination today, the patient's problem(s) is/are adequately but not ideally controlled.  New problems have not been presented today.   Diagnostic uncertainty are not complicating management of the primary condition.  There are no active rule-out diagnoses for this patient at this time.     General Impression: Patient is a 36 year old female with a psychaitric history of bipolar disorder, BREE,  PTSD, substance abuse and ADHD. Patient is amenable to increasing Lamictal to 150 mg.       ICD-10-CM ICD-9-CM   1. Bipolar 1 disorder  F31.9 296.7   2. BREE (generalized anxiety disorder)  F41.1 300.02       Intervention/Counseling/Treatment Plan   · Medication  Management: Continue current medications.   · Increase Lamictal to 150 mg daily  · Continue Ativan 0.5 mg daily   Discussed with patient informed consent, risks vs. benefits, alternative treatments, side effect profile and the inherent unpredictability of individual responses to these treatments. The patient expresses understanding of the above and displays the capacity to agree with this current plan and had no other questions.  Encouraged patient to keep future appointments.   Encouraged patient to message or call with questions or concerns  Safety plan reviewed with patient for worsening condition or suicidal ideations. In the event of an emergency patient was advised to go to the emergency room.      Return to Clinic: 1 month

## 2022-03-23 ENCOUNTER — PATIENT MESSAGE (OUTPATIENT)
Dept: PSYCHIATRY | Facility: CLINIC | Age: 37
End: 2022-03-23
Payer: MEDICAID

## 2022-03-23 RX ORDER — LORAZEPAM 0.5 MG/1
0.5 TABLET ORAL 2 TIMES DAILY PRN
Qty: 60 TABLET | Refills: 1 | Status: SHIPPED | OUTPATIENT
Start: 2022-03-23 | End: 2023-03-23

## 2022-09-20 NOTE — PROGRESS NOTES
"OBSTETRICS AND GYNECOLOGY      Chief Complaint:  Well Woman Exam     HPI:      Enriqueta Quiroz is a 37 y.o.  who presents today for well woman exam.  LMP: Patient's last menstrual period was 2022 (exact date). Specifically, patient denies abnormal discharge/odor, pelvic pain, or urinary problems/incontinence. Ms. Quiroz is currently sexually active with a single male partner. She is currently using bilateral tubal ligation for contraception. She declines STD screening today. Patient endorses menorrhagia with associated dysmenorrhea. Feels this has worsened since BTL.     Previous Pap: , thinks ?maybe one abnormal pap smear in the past but denies need for biopsies, surgeries on cervix    Gardasil:has never had; to consider    Ms. Quiroz confirms that she wears her seatbelt when riding in the car and does not text while driving.     OB History          2    Para   1    Term   1            AB   1    Living   1         SAB        IAB   1    Ectopic        Multiple   0    Live Births   1             Regular, monthly menses. Menses last 7 days. Associated menorrhagia, cramping pain. Uses pad and tampon with blood saturating clothes. Feels this has worsened since BTL. Sexually active with male partner. BTL for contraception. Menarche age 13.     ROS:     GENERAL: Feeling well overall.   SKIN: Denies rash or lesions.   HEENT: Denies headaches or vision changes.   CARDIOVASCULAR: Denies chest pain.   BREASTS: Denies lumps or nipple discharge.   ABD: Denies constipation, diarrhea, nausea, vomiting.  URINARY: Denies dysuria, hematuria.  NEUROLOGIC: Denies syncope.   PSYCHIATRIC: Denies uncontrolled depression. Endorses history of anxiety, chronic, stable at this time.    Physical Exam:      PHYSICAL EXAM:  /70   Ht 5' 6" (1.676 m)   Wt 59 kg (130 lb 1.1 oz)   LMP 2022 (Exact Date)   BMI 20.99 kg/m²   Body mass index is 20.99 kg/m².     APPEARANCE:  Well nourished, well " developed, in no acute distress.  Able to smile appropriately during our encounter. Makes eye contact. Pleasant.  PSYCH: Appropriate mood and affect.  SKIN:   No acne or hirsutism.  CARDIOVASCULAR:  No edema of peripheral extremities. Well perfused throughout.  RESP:  No accessory muscle use to breathe. Speaking comfortably in complete sentences.   BREASTS:  Symmetrical, with no visible skin lesions or scars, no palpable masses. No nipple discharge or peau d'orange bilaterally. No palpable axillary LAD.  ABDOMEN:  Soft. No tenderness or masses.    PELVIC:  Normal external genitalia without lesions. Normal hair distribution. Adequate perineal body, normal urethral meatus. Vagina moist and well rugated. Without lesions, without discharge. Cervix 3x4cm, parous, large os. At 12:00 on inner external os is a small, circular, light red polyp, about 0.2cm in widest diameter, with thin, small stalk (0.1cm); able to apply ring forceps and with gentle twisting motion remove without issue or pain. Silver nitrate applied at base. Hemostatic. Cervix pink, without discharge or tenderness.  No ectropion. No significant cystocele or rectocele.  Bimanual exam not performed today in setting of silver nitrate placement.    Assessment/Plan:     Visit Diagnoses       Encounter for well woman exam    -  Primary     Preventive Health Maintenance  -- Counseled patient regarding healthy diet and regular exercise, daily multivitamin, daily seat belt use.   -- Discussed weight, ideal BMI <= 25, nutrition consult discussed.  -- She denies abuse and depression and feels safe at home.  -- Immunizations:  flu - declines  -- Pap smear: performed today  -- Contraception:  BTL  -- STD screening - declines   -- BP normotensive  -- Reports history of a ?cyst during her pregnancy > upon chart review, MRI Pelvis (6/2021) reveals exophytic mass along left aspect of ROSA and cervix, degenerating subserosal leiomyoma favored  -- Will order TVUS to assess  characteristics/persistence of lesion and for uterus measurements, may dictate options for menorrhagia  -- Diagnosis of bipolar disorder/anxiety, states she is following with a therapist outside of [facility] system; on medication, utilizes medical marijuana which she states helps, denies depression, denies SI/HI today    Cervical Polyp  -- Endocervical polyp removed today as above  -- Patient denies prior history, denies being told in the past about polyps  -- Endometrial (prevalence about 8%) and endocervical (prevalence about 5%) polyps are common occurrences  -- Explained that polyps represent areas of persistent growth  -- Discussed most likely diagnoses include polyp vs fibroid vs cervical mucosal fold s/p healing from recent vaginal delivery, will send to pathology for official diagnosis  -- Will follow up path results with patient  -- Discussed spotting, discharge from silver nitrate    Menorrhagia  - Regular, monthly menses. Menses last 7 days. Associated menorrhagia, cramping pain.   - Uses pad and tampon with blood saturating clothes.   - Feels this has worsened since BTL.   - Exam as above  - Will have patient return with pre-visit TVUS after next menses to discuss options for management        Follow up in about 4 weeks (around 10/20/2022) for menorrhagia discussion, options.    Counseling:     Patient was counseled today on current ASCCP pap guidelines, the recommendation for yearly physical exams, safe driving habits, breast self awareness. She is to see her PCP for other health maintenance.     Use of the [employer] Patient Portal discussed and encouraged during today's visit.                 As of April 1, 2021, the Cures Act has been passed nationally. This new law requires that all doctors progress notes, lab results, pathology reports and radiology reports be released IMMEDIATELY to the patient in the patient portal. That means that the results are released to you at the EXACT same time they are  released to me. Therefore, with all of the patients that I have I am not able to reply to each patient exactly when the results come in. So there will be a delay from when you see the results to when I see them and have time to come up with a response to send you. Also I only see these results when I am on the computer at work. So if the results come in over the weekend or after 5 pm of a work day, I will not see them until the next business day. As you can tell, this is a challenge as a physician to give every patient the quick response they hope for and deserve. So please be patient!   Thanks for your understanding and patience.

## 2022-09-22 ENCOUNTER — OFFICE VISIT (OUTPATIENT)
Dept: OBSTETRICS AND GYNECOLOGY | Facility: CLINIC | Age: 37
End: 2022-09-22
Payer: MEDICAID

## 2022-09-22 VITALS
WEIGHT: 130.06 LBS | DIASTOLIC BLOOD PRESSURE: 70 MMHG | SYSTOLIC BLOOD PRESSURE: 120 MMHG | HEIGHT: 66 IN | BODY MASS INDEX: 20.9 KG/M2

## 2022-09-22 DIAGNOSIS — Z11.51 SCREENING FOR HUMAN PAPILLOMAVIRUS: ICD-10-CM

## 2022-09-22 DIAGNOSIS — N84.1 CERVICAL POLYP: ICD-10-CM

## 2022-09-22 DIAGNOSIS — Z01.419 ENCOUNTER FOR WELL WOMAN EXAM: Primary | ICD-10-CM

## 2022-09-22 PROCEDURE — 87624 HPV HI-RISK TYP POOLED RSLT: CPT | Performed by: STUDENT IN AN ORGANIZED HEALTH CARE EDUCATION/TRAINING PROGRAM

## 2022-09-22 PROCEDURE — 99213 OFFICE O/P EST LOW 20 MIN: CPT | Mod: PBBFAC | Performed by: STUDENT IN AN ORGANIZED HEALTH CARE EDUCATION/TRAINING PROGRAM

## 2022-09-22 PROCEDURE — 3074F SYST BP LT 130 MM HG: CPT | Mod: CPTII,,, | Performed by: STUDENT IN AN ORGANIZED HEALTH CARE EDUCATION/TRAINING PROGRAM

## 2022-09-22 PROCEDURE — 1160F RVW MEDS BY RX/DR IN RCRD: CPT | Mod: CPTII,,, | Performed by: STUDENT IN AN ORGANIZED HEALTH CARE EDUCATION/TRAINING PROGRAM

## 2022-09-22 PROCEDURE — 99395 PR PREVENTIVE VISIT,EST,18-39: ICD-10-PCS | Mod: S$PBB,,, | Performed by: STUDENT IN AN ORGANIZED HEALTH CARE EDUCATION/TRAINING PROGRAM

## 2022-09-22 PROCEDURE — 99999 PR PBB SHADOW E&M-EST. PATIENT-LVL III: CPT | Mod: PBBFAC,,, | Performed by: STUDENT IN AN ORGANIZED HEALTH CARE EDUCATION/TRAINING PROGRAM

## 2022-09-22 PROCEDURE — 88175 CYTOPATH C/V AUTO FLUID REDO: CPT | Performed by: STUDENT IN AN ORGANIZED HEALTH CARE EDUCATION/TRAINING PROGRAM

## 2022-09-22 PROCEDURE — 88305 TISSUE EXAM BY PATHOLOGIST: ICD-10-PCS | Mod: 26,,, | Performed by: PATHOLOGY

## 2022-09-22 PROCEDURE — 3008F BODY MASS INDEX DOCD: CPT | Mod: CPTII,,, | Performed by: STUDENT IN AN ORGANIZED HEALTH CARE EDUCATION/TRAINING PROGRAM

## 2022-09-22 PROCEDURE — 3008F PR BODY MASS INDEX (BMI) DOCUMENTED: ICD-10-PCS | Mod: CPTII,,, | Performed by: STUDENT IN AN ORGANIZED HEALTH CARE EDUCATION/TRAINING PROGRAM

## 2022-09-22 PROCEDURE — 3078F PR MOST RECENT DIASTOLIC BLOOD PRESSURE < 80 MM HG: ICD-10-PCS | Mod: CPTII,,, | Performed by: STUDENT IN AN ORGANIZED HEALTH CARE EDUCATION/TRAINING PROGRAM

## 2022-09-22 PROCEDURE — 99999 PR PBB SHADOW E&M-EST. PATIENT-LVL III: ICD-10-PCS | Mod: PBBFAC,,, | Performed by: STUDENT IN AN ORGANIZED HEALTH CARE EDUCATION/TRAINING PROGRAM

## 2022-09-22 PROCEDURE — 3078F DIAST BP <80 MM HG: CPT | Mod: CPTII,,, | Performed by: STUDENT IN AN ORGANIZED HEALTH CARE EDUCATION/TRAINING PROGRAM

## 2022-09-22 PROCEDURE — 1159F MED LIST DOCD IN RCRD: CPT | Mod: CPTII,,, | Performed by: STUDENT IN AN ORGANIZED HEALTH CARE EDUCATION/TRAINING PROGRAM

## 2022-09-22 PROCEDURE — 1160F PR REVIEW ALL MEDS BY PRESCRIBER/CLIN PHARMACIST DOCUMENTED: ICD-10-PCS | Mod: CPTII,,, | Performed by: STUDENT IN AN ORGANIZED HEALTH CARE EDUCATION/TRAINING PROGRAM

## 2022-09-22 PROCEDURE — 88305 TISSUE EXAM BY PATHOLOGIST: CPT | Performed by: PATHOLOGY

## 2022-09-22 PROCEDURE — 88305 TISSUE EXAM BY PATHOLOGIST: CPT | Mod: 26,,, | Performed by: PATHOLOGY

## 2022-09-22 PROCEDURE — 99395 PREV VISIT EST AGE 18-39: CPT | Mod: S$PBB,,, | Performed by: STUDENT IN AN ORGANIZED HEALTH CARE EDUCATION/TRAINING PROGRAM

## 2022-09-22 PROCEDURE — 3074F PR MOST RECENT SYSTOLIC BLOOD PRESSURE < 130 MM HG: ICD-10-PCS | Mod: CPTII,,, | Performed by: STUDENT IN AN ORGANIZED HEALTH CARE EDUCATION/TRAINING PROGRAM

## 2022-09-22 PROCEDURE — 1159F PR MEDICATION LIST DOCUMENTED IN MEDICAL RECORD: ICD-10-PCS | Mod: CPTII,,, | Performed by: STUDENT IN AN ORGANIZED HEALTH CARE EDUCATION/TRAINING PROGRAM

## 2022-09-22 RX ORDER — LAMOTRIGINE 25 MG/1
TABLET ORAL
COMMUNITY
Start: 2022-08-12

## 2022-09-25 LAB
HPV HR 12 DNA SPEC QL NAA+PROBE: NEGATIVE
HPV16 AG SPEC QL: NEGATIVE
HPV18 DNA SPEC QL NAA+PROBE: NEGATIVE

## 2022-09-27 LAB
FINAL PATHOLOGIC DIAGNOSIS: NORMAL
FINAL PATHOLOGIC DIAGNOSIS: NORMAL
GROSS: NORMAL
Lab: NORMAL
Lab: NORMAL

## 2022-10-03 PROBLEM — Z3A.34 34 WEEKS GESTATION OF PREGNANCY: Status: RESOLVED | Noted: 2021-10-21 | Resolved: 2022-10-03

## 2023-04-13 ENCOUNTER — PATIENT MESSAGE (OUTPATIENT)
Dept: OBSTETRICS AND GYNECOLOGY | Facility: CLINIC | Age: 38
End: 2023-04-13
Payer: MEDICAID

## 2023-04-26 ENCOUNTER — PATIENT MESSAGE (OUTPATIENT)
Dept: OBSTETRICS AND GYNECOLOGY | Facility: CLINIC | Age: 38
End: 2023-04-26
Payer: MEDICAID

## 2023-04-27 ENCOUNTER — PATIENT MESSAGE (OUTPATIENT)
Dept: OBSTETRICS AND GYNECOLOGY | Facility: CLINIC | Age: 38
End: 2023-04-27
Payer: MEDICAID

## 2023-05-11 PROBLEM — F19.10 SUBSTANCE ABUSE AFFECTING PREGNANCY IN THIRD TRIMESTER, ANTEPARTUM: Status: RESOLVED | Noted: 2021-11-19 | Resolved: 2023-05-11

## 2023-05-11 PROBLEM — F31.9 BIPOLAR DISEASE DURING PREGNANCY IN THIRD TRIMESTER: Status: RESOLVED | Noted: 2021-10-21 | Resolved: 2023-05-11

## 2023-05-11 PROBLEM — R19.00 PELVIC MASS DURING PREGNANCY: Status: RESOLVED | Noted: 2021-06-22 | Resolved: 2023-05-11

## 2023-05-11 PROBLEM — Z01.419 ENCOUNTER FOR WELL WOMAN EXAM: Status: RESOLVED | Noted: 2022-09-22 | Resolved: 2023-05-11

## 2023-05-11 PROBLEM — O10.919 CHRONIC HYPERTENSION AFFECTING PREGNANCY: Status: RESOLVED | Noted: 2021-08-13 | Resolved: 2023-05-11

## 2023-05-11 PROBLEM — R03.0 ELEVATED BLOOD PRESSURE READING: Status: RESOLVED | Noted: 2021-09-13 | Resolved: 2023-05-11

## 2023-05-11 PROBLEM — O99.343 BIPOLAR DISEASE DURING PREGNANCY IN THIRD TRIMESTER: Status: RESOLVED | Noted: 2021-10-21 | Resolved: 2023-05-11

## 2023-05-11 PROBLEM — Z98.51 S/P TUBAL LIGATION: Status: RESOLVED | Noted: 2021-11-22 | Resolved: 2023-05-11

## 2023-05-11 PROBLEM — O99.323 SUBSTANCE ABUSE AFFECTING PREGNANCY IN THIRD TRIMESTER, ANTEPARTUM: Status: RESOLVED | Noted: 2021-11-19 | Resolved: 2023-05-11

## 2023-05-11 PROBLEM — O26.899 PELVIC MASS DURING PREGNANCY: Status: RESOLVED | Noted: 2021-06-22 | Resolved: 2023-05-11

## 2023-05-11 PROBLEM — O99.019 ANEMIA AFFECTING FIRST PREGNANCY: Status: RESOLVED | Noted: 2021-11-19 | Resolved: 2023-05-11

## 2023-05-11 PROBLEM — O36.8390 FETAL HEART RATE DECELERATIONS AFFECTING MANAGEMENT OF MOTHER: Status: RESOLVED | Noted: 2021-10-21 | Resolved: 2023-05-11

## 2023-05-11 PROCEDURE — 99283 EMERGENCY DEPT VISIT LOW MDM: CPT

## 2023-05-11 PROCEDURE — 93005 ELECTROCARDIOGRAM TRACING: CPT

## 2023-05-11 PROCEDURE — 93010 ELECTROCARDIOGRAM REPORT: CPT | Mod: ,,, | Performed by: INTERNAL MEDICINE

## 2023-05-11 PROCEDURE — 99283 PR EMERGENCY DEPT VISIT,LEVEL III: ICD-10-PCS | Mod: ,,, | Performed by: EMERGENCY MEDICINE

## 2023-05-11 PROCEDURE — 99283 EMERGENCY DEPT VISIT LOW MDM: CPT | Mod: ,,, | Performed by: EMERGENCY MEDICINE

## 2023-05-11 PROCEDURE — 93010 EKG 12-LEAD: ICD-10-PCS | Mod: ,,, | Performed by: INTERNAL MEDICINE

## 2023-05-12 ENCOUNTER — HOSPITAL ENCOUNTER (EMERGENCY)
Facility: HOSPITAL | Age: 38
Discharge: LEFT BEFORE DISCHARGE | End: 2023-05-12
Attending: EMERGENCY MEDICINE
Payer: MEDICAID

## 2023-05-12 VITALS
TEMPERATURE: 97 F | HEART RATE: 87 BPM | SYSTOLIC BLOOD PRESSURE: 125 MMHG | WEIGHT: 130 LBS | HEIGHT: 66 IN | BODY MASS INDEX: 20.89 KG/M2 | OXYGEN SATURATION: 99 % | RESPIRATION RATE: 14 BRPM | DIASTOLIC BLOOD PRESSURE: 56 MMHG

## 2023-05-12 DIAGNOSIS — R55 SYNCOPE: ICD-10-CM

## 2023-05-12 RX ORDER — ONDANSETRON 2 MG/ML
4 INJECTION INTRAMUSCULAR; INTRAVENOUS
Status: DISCONTINUED | OUTPATIENT
Start: 2023-05-12 | End: 2023-05-12 | Stop reason: HOSPADM

## 2023-05-12 NOTE — ED TRIAGE NOTES
Enriqueta Quiroz, a 38 y.o. female presents to the ED w/ complaint of     Triage note:  Chief Complaint   Patient presents with    Loss of Consciousness     [facility], got sweaty and woke up on the floor. +N/V.      Review of patient's allergies indicates:  No Known Allergies  Past Medical History:   Diagnosis Date    Anxiety     Asthma     Bipolar disorder     Chronic hypertension affecting pregnancy 8/13/2021    History of substance abuse     Yessenia     Mood disorder     Psychiatric exam requested by [facility]     Psychiatric problem     Therapy     Patient identifiers for Enriqueta Quiroz checked and correct.    LOC: The patient is drowsy but alert and aware of environment with an appropriate affect, the patient is oriented x 4 and speaking appropriately.    APPEARANCE: Patient resting comfortably and in no acute distress, patient is clean and well groomed, patient's clothing is properly fastened.    SKIN: The skin is warm and dry, color consistent with ethnicity, patient has normal skin turgor and moist mucus membranes, skin intact, no breakdown or bruising noted.    MUSCULOSKELETAL: Patient moving all extremities well, no obvious swelling or deformities noted. Reports generalized weakness    RESPIRATORY: Airway is open and patent, respirations are spontaneous and even, patient has a normal effort and rate.    CARDIAC: Patient has a normal rate and rhythm, no periphreal edema noted, capillary refill < 3 seconds. Normal +2 pedal pulses present.    ABDOMEN: Soft and non tender to palpation, no distention noted. Patient denies any nausea, vomiting, diarrhea, or constipation.     NEUROLOGIC: Eyes open spontaneously, PERRL, behavior appropriate to situation, follows commands, facial expression symmetrical, bilateral hand grasp equal and even, purposeful motor response noted, normal sensation in all extremities.     HEENT: No abnormalities noted. White sclera and pupils equal round and  reactive to light. Denies headache, dizziness.     : Pt voids independently, denies dysuria, hematuria, frequency.

## 2023-05-12 NOTE — ED PROVIDER NOTES
Encounter Date: 5/11/2023       History     Chief Complaint   Patient presents with    Loss of Consciousness     Patient reports she was at [facility], got sweaty and woke up on the floor. +N/V.      HPI    This is a 30-year-old female history of anxiety asthma bipolar disorder substance use presents the ER for evaluation of syncope and collapse.  Patient reports she was at [facility] in the bathroom, she just got her.  She was changing her pad when she syncopized.  She woke up on the floor diaphoretic.  With nausea vomiting.  Never had anything like this before came the ER for further evaluation.    Review of patient's allergies indicates:  No Known Allergies  Past Medical History:   Diagnosis Date    Anxiety     Asthma     Bipolar disorder     Chronic hypertension affecting pregnancy 8/13/2021    History of substance abuse     Yessenia     Mood disorder     Psychiatric exam requested by authority     Psychiatric problem     Therapy      Past Surgical History:   Procedure Laterality Date    DILATION AND CURETTAGE OF UTERUS      POSTPARTUM LIGATION OF FALLOPIAN TUBE N/A 11/23/2021    Procedure: LIGATION, FALLOPIAN TUBE, POSTPARTUM;  Surgeon: Christina Harris MD;  [facility]: [facility];  Service: OB/GYN;  Laterality: N/A;     Family History   Problem Relation Age of Onset    Breast cancer Neg Hx     Colon cancer Neg Hx     Ovarian cancer Neg Hx      Social History     Tobacco Use    Smoking status: Every Day     Packs/day: 0.25     Years: 20.00     Pack years: 5.00     Types: Cigarettes, Vaping w/o nicotine    Smokeless tobacco: Never   Substance Use Topics    Alcohol use: No    Drug use: Yes     Types: Marijuana     Comment: Medical Marijuana     Review of Systems   Constitutional:  Positive for fatigue. Negative for fever.   Respiratory:  Positive for shortness of breath.    Cardiovascular:  Negative for chest pain.   Gastrointestinal:  Positive for nausea and vomiting. Negative for abdominal pain.   All other systems reviewed  and are negative.    Physical Exam     Initial Vitals [05/11/23 2350]   BP Pulse Resp Temp SpO2   (!) 125/56 87 14 97.4 °F (36.3 °C) 99 %      MAP       --         Physical Exam    Nursing note and vitals reviewed.  Constitutional: She appears well-developed and well-nourished. No distress.   HENT:   Head: Normocephalic and atraumatic.   Eyes: Pupils are equal, round, and reactive to light.   Neck:   Normal range of motion.  Cardiovascular:  Normal rate, regular rhythm and normal heart sounds.           Pulmonary/Chest: Breath sounds normal. No respiratory distress.   Abdominal: Abdomen is soft. She exhibits no distension. There is no abdominal tenderness.   Musculoskeletal:         General: Normal range of motion.      Cervical back: Normal range of motion.     Neurological: She is alert and oriented to person, place, and time. She has normal strength. GCS score is 15. GCS eye subscore is 4. GCS verbal subscore is 5. GCS motor subscore is 6.   Skin: Skin is warm and dry. Capillary refill takes less than 2 seconds.   Psychiatric: She has a normal mood and affect. Thought content normal.       ED Course   Procedures  Labs Reviewed - No data to display    EKG Readings: (Independently Interpreted)   Normal sinus rhythm normal intervals and axis no STEMI     Imaging Results    None          Medications - No data to display    Medical Decision Making:   Initial Assessment:   This is a 30-year-old female with former history of polysubstance use, history of bipolar anxiety asthma presents the ER for evaluation of syncope and collapse.  Patient reports she was in the bathroom, where she had a syncopal episode.  She woke up on the floor, with nausea vomiting in general malaise and she came the ER for further evaluation.  She arrived in the ER, no acute distress she is anxious appearing she denied any drug or alcohol use tonight, she was neurologically intact.  Differential includes orthostatic versus vasovagal syncope,  cardiogenic syncope, dehydration, closed head trauma other cause.  Will plan blood work symptomatic support and reassess.  ED Management:  Informed by nurse that patient eloped prior to blood work and further treatment.  Welcome to come the ER for re-evaluation.  And to complete treatment                        Clinical Impression:   Final diagnoses:  [R55] Syncope        ED Disposition Condition    Eloped                 Jose Corea MD  05/12/23 06